# Patient Record
Sex: MALE | Race: WHITE
[De-identification: names, ages, dates, MRNs, and addresses within clinical notes are randomized per-mention and may not be internally consistent; named-entity substitution may affect disease eponyms.]

---

## 2019-07-10 ENCOUNTER — TRANSCRIPTION ENCOUNTER (OUTPATIENT)
Age: 24
End: 2019-07-10

## 2021-11-03 ENCOUNTER — EMERGENCY (EMERGENCY)
Facility: HOSPITAL | Age: 26
LOS: 0 days | Discharge: HOME | End: 2021-11-03
Attending: STUDENT IN AN ORGANIZED HEALTH CARE EDUCATION/TRAINING PROGRAM | Admitting: STUDENT IN AN ORGANIZED HEALTH CARE EDUCATION/TRAINING PROGRAM
Payer: OTHER MISCELLANEOUS

## 2021-11-03 VITALS
OXYGEN SATURATION: 96 % | HEART RATE: 78 BPM | TEMPERATURE: 98 F | RESPIRATION RATE: 18 BRPM | DIASTOLIC BLOOD PRESSURE: 68 MMHG | SYSTOLIC BLOOD PRESSURE: 114 MMHG

## 2021-11-03 DIAGNOSIS — M54.50 LOW BACK PAIN, UNSPECIFIED: ICD-10-CM
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
## 2021-11-03 DIAGNOSIS — S39.012A STRAIN OF MUSCLE, FASCIA AND TENDON OF LOWER BACK, INITIAL ENCOUNTER: ICD-10-CM

## 2021-11-03 PROCEDURE — 99284 EMERGENCY DEPT VISIT MOD MDM: CPT

## 2021-11-03 RX ORDER — IBUPROFEN 200 MG
600 TABLET ORAL ONCE
Refills: 0 | Status: COMPLETED | OUTPATIENT
Start: 2021-11-03 | End: 2021-11-03

## 2021-11-03 RX ORDER — DEXAMETHASONE 0.5 MG/5ML
8 ELIXIR ORAL ONCE
Refills: 0 | Status: COMPLETED | OUTPATIENT
Start: 2021-11-03 | End: 2021-11-03

## 2021-11-03 RX ORDER — IBUPROFEN 200 MG
1 TABLET ORAL
Qty: 21 | Refills: 0
Start: 2021-11-03 | End: 2021-11-09

## 2021-11-03 RX ORDER — METHOCARBAMOL 500 MG/1
1 TABLET, FILM COATED ORAL
Qty: 21 | Refills: 0
Start: 2021-11-03 | End: 2021-11-09

## 2021-11-03 RX ORDER — METHOCARBAMOL 500 MG/1
1000 TABLET, FILM COATED ORAL ONCE
Refills: 0 | Status: COMPLETED | OUTPATIENT
Start: 2021-11-03 | End: 2021-11-03

## 2021-11-03 RX ADMIN — Medication 600 MILLIGRAM(S): at 15:56

## 2021-11-03 RX ADMIN — METHOCARBAMOL 1000 MILLIGRAM(S): 500 TABLET, FILM COATED ORAL at 15:56

## 2021-11-03 RX ADMIN — Medication 8 MILLIGRAM(S): at 15:56

## 2021-11-03 NOTE — ED PROVIDER NOTE - CARE PROVIDER_API CALL
Wilfrido Vasquez (MD)  Orthopaedic Surgery  [address] [place] [address]  Phone: [phone]  Fax: [phone]  Follow Up Time:

## 2021-11-03 NOTE — ED PROVIDER NOTE - NSFOLLOWUPCLINICS_GEN_ALL_ED_FT
[facility] ([facility])  Rehabilitation  [facility] [address], [address] [place] [address]  Phone: [phone]  Fax:

## 2021-11-03 NOTE — ED ADULT TRIAGE NOTE - TEMPERATURE IN FAHRENHEIT (DEGREES F)
"Chief Complaint   Patient presents with   • Pelvic Pain     Pt was seen twice yesterday for c/o pain, states continues to have pain   • Low Back Pain     /86   Pulse 88   Temp 36.9 °C (98.5 °F) (Temporal)   Resp 14   Ht 1.651 m (5' 5\")   Wt 53.5 kg (117 lb 15.1 oz)   LMP 08/10/2021   SpO2 98%   BMI 19.63 kg/m²     Pt ambulated to ED w/ visitor for c/o pelvic pain and low back pain, denies N/V at this time, states has been spotting.    "
97.8

## 2021-11-03 NOTE — ED PROVIDER NOTE - PATIENT PORTAL LINK FT
You can access the FollowMyHealth Patient Portal offered by [facility] by registering at the following website: [email or web address]. By joining [employer]’s FollowMyHealth portal, you will also be able to view your health information using other applications (apps) compatible with our system.
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
## 2022-01-18 ENCOUNTER — EMERGENCY (EMERGENCY)
Facility: HOSPITAL | Age: 27
LOS: 0 days | Discharge: HOME | End: 2022-01-18
Attending: STUDENT IN AN ORGANIZED HEALTH CARE EDUCATION/TRAINING PROGRAM | Admitting: STUDENT IN AN ORGANIZED HEALTH CARE EDUCATION/TRAINING PROGRAM
Payer: COMMERCIAL

## 2022-01-18 VITALS
SYSTOLIC BLOOD PRESSURE: 122 MMHG | RESPIRATION RATE: 18 BRPM | TEMPERATURE: 99 F | HEART RATE: 74 BPM | OXYGEN SATURATION: 99 % | DIASTOLIC BLOOD PRESSURE: 79 MMHG
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
## 2022-01-18 DIAGNOSIS — S09.90XA UNSPECIFIED INJURY OF HEAD, INITIAL ENCOUNTER: ICD-10-CM
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
## 2022-01-18 DIAGNOSIS — S01.112A LACERATION WITHOUT FOREIGN BODY OF LEFT EYELID AND PERIOCULAR AREA, INITIAL ENCOUNTER: ICD-10-CM

## 2022-01-18 PROBLEM — Z78.9 OTHER SPECIFIED HEALTH STATUS: Chronic | Status: ACTIVE | Noted: 2021-11-03

## 2022-01-18 PROCEDURE — 99283 EMERGENCY DEPT VISIT LOW MDM: CPT | Mod: 25

## 2022-01-18 PROCEDURE — 12011 RPR F/E/E/N/L/M 2.5 CM/<: CPT
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
## 2022-01-18 NOTE — ED PROVIDER NOTE - PHYSICAL EXAMINATION
VITAL SIGNS: I have reviewed nursing notes and confirm.  CONSTITUTIONAL: non-toxic, well appearing  SKIN: no rash, no petechiae, (+) 2 cm laceration over L upper eyelid, no active bleeding, streaking or surrounding erythema or edema.   EYES: PERRL, EOMI, pink conjunctiva, anicteric  ENT: tongue midline, no exudates, MMM  NECK: Supple; no meningismus, no JVD  CARD: RRR, no murmurs, equal radial pulses bilaterally 2+  RESP: CTAB, no respiratory distress  ABD: Soft, non-tender, non-distended, no peritoneal signs, no HSM, no CVA tenderness  EXT: Normal ROM x4. No edema. No calves tenderness  NEURO: Alert, oriented. CN2-12 intact, equal strength bilaterally, nl gait.  PSYCH: Cooperative, appropriate. VITAL SIGNS: I have reviewed nursing notes and confirm.  CONSTITUTIONAL: non-toxic, well appearing  SKIN: no rash, no petechiae, (+) 2 cm laceration over L upper eyelid, no active bleeding, streaking or surrounding erythema or edema.   EYES: PERRL, EOMI, pink conjunctiva, anicteric  ENT: tongue midline, no exudates, MMM  NECK: Supple; no cervical tenderness  EXT: Normal ROM x4. No edema. No calves tenderness  NEURO: Alert, oriented. CN2-12 intact, equal strength bilaterally, nl gait.

## 2022-01-18 NOTE — ED PROVIDER NOTE - NSFOLLOWUPINSTRUCTIONS_ED_ALL_ED_FT
Laceration    A laceration is a cut that goes through all of the layers of the skin and into the tissue that is right under the skin. Some lacerations heal on their own. Others need to be closed with stitches (sutures), staples, skin adhesive strips, or skin glue. Proper laceration care minimizes the risk of infection and helps the laceration to heal better.     SEEK IMMEDIATE MEDICAL CARE IF YOU HAVE THE FOLLOWING SYMPTOMS: swelling around the wound, worsening pain, drainage from the wound, red streaking going away from your wound, inability to move finger or toe near the laceration, or discoloration of skin near the laceration.      suture removal in 5 days

## 2022-01-18 NOTE — ED PROVIDER NOTE - NS ED ROS FT
Constitutional: See HPI. No fever/chills.  Eyes: No visual changes, eye pain or discharge.  ENT: No hearing changes, pain, discharge or infections.  Neck: No neck pain or stiffness.  Cardiac: No chest pain, SOB or edema. No chest pain with exertion.  Respiratory: No cough or respiratory distress. No hemoptysis.   GI: No nausea, vomiting, diarrhea or abdominal pain.  MS: No myalgia, muscle weakness, joint pain or back pain.  Neuro: No headache or weakness. No LOC.  Skin: No rash. (+) Laceration to L upper eyelid.   Except as documented in the HPI, all other systems are negative. Constitutional: See HPI. No fever/chills.  Eyes: No visual changes, eye pain or discharge.  ENT: No hearing changes, pain, discharge or infections.  Neck: No neck pain or stiffness.  Neuro: No headache or weakness. No LOC.  Skin: No rash. (+) Laceration to L upper eyelid.   Except as documented in the HPI, all other systems are negative.

## 2022-01-18 NOTE — ED PROVIDER NOTE - CLINICAL SUMMARY MEDICAL DECISION MAKING FREE TEXT BOX
Patient p/w laceration to L eyebrow. Exam as above. Lac repaired. Tetanus utd. No sx of concussion. The patient left the ER prior to my evaluation.

## 2022-01-18 NOTE — ED PROVIDER NOTE - PATIENT PORTAL LINK FT
You can access the FollowMyHealth Patient Portal offered by [facility] by registering at the following website: [email or web address]. By joining [employer]’s FollowMyHealth portal, you will also be able to view your health information using other applications (apps) compatible with our system.

## 2022-01-23 ENCOUNTER — EMERGENCY (EMERGENCY)
Facility: HOSPITAL | Age: 27
LOS: 0 days | Discharge: HOME | End: 2022-01-23
Attending: EMERGENCY MEDICINE | Admitting: EMERGENCY MEDICINE
Payer: COMMERCIAL

## 2022-01-23 VITALS
HEIGHT: 72 IN | HEART RATE: 51 BPM | WEIGHT: 171.96 LBS | DIASTOLIC BLOOD PRESSURE: 66 MMHG | SYSTOLIC BLOOD PRESSURE: 111 MMHG | TEMPERATURE: 97 F | RESPIRATION RATE: 18 BRPM | OXYGEN SATURATION: 100 %

## 2022-01-23 DIAGNOSIS — S01.112D LACERATION WITHOUT FOREIGN BODY OF LEFT EYELID AND PERIOCULAR AREA, SUBSEQUENT ENCOUNTER: ICD-10-CM
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
## 2022-01-23 PROCEDURE — L9995: CPT

## 2022-01-23 NOTE — ED PROVIDER NOTE - PATIENT PORTAL LINK FT
You can access the FollowMyHealth Patient Portal offered by [facility] by registering at the following website: [email or web address]. By joining [employer]’s FollowMyHealth portal, you will also be able to view your health information using other applications (apps) compatible with our system.

## 2022-01-23 NOTE — ED PROVIDER NOTE - OBJECTIVE STATEMENT
Patient requests suture removal, Placed 5 days ago in ED, No complaints offered, no HA, vision changes

## 2022-01-23 NOTE — ED PROVIDER NOTE - ENMT, MLM
Airway patent, Nasal mucosa clear. Mouth with normal mucosa. Throat has no vesicles, no oropharyngeal exudates and uvula is midline. well healed laceration left eyebrow, no infection

## 2022-01-23 NOTE — ED PROCEDURE NOTE - ATTENDING CONTRIBUTION TO CARE
I was present for the key portions of the procedure and available as supervisor for the entire procedure as documented. I was present for the key portions of the procedure and available as supervisor for the entire procedure as documented..
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
## 2022-01-23 NOTE — ED ADULT TRIAGE NOTE - NS ED TRIAGE AVPU SCALE
Patient Alert-The patient is alert, awake and responds to voice. The patient is oriented to time, place, and person. The triage nurse is able to obtain subjective information.

## 2022-12-27 ENCOUNTER — EMERGENCY (EMERGENCY)
Facility: HOSPITAL | Age: 27
LOS: 1 days | Discharge: HOME | End: 2022-12-27
Admitting: EMERGENCY MEDICINE
Payer: OTHER MISCELLANEOUS

## 2022-12-27 VITALS
TEMPERATURE: 98 F | DIASTOLIC BLOOD PRESSURE: 67 MMHG | SYSTOLIC BLOOD PRESSURE: 116 MMHG | OXYGEN SATURATION: 96 % | RESPIRATION RATE: 16 BRPM | HEART RATE: 57 BPM

## 2022-12-27 VITALS
OXYGEN SATURATION: 99 % | SYSTOLIC BLOOD PRESSURE: 109 MMHG | HEART RATE: 72 BPM | WEIGHT: 179.9 LBS | HEIGHT: 72 IN | TEMPERATURE: 98 F | DIASTOLIC BLOOD PRESSURE: 70 MMHG | RESPIRATION RATE: 18 BRPM

## 2022-12-27 PROCEDURE — 99285 EMERGENCY DEPT VISIT HI MDM: CPT

## 2022-12-27 PROCEDURE — 73000 X-RAY EXAM OF COLLAR BONE: CPT | Mod: 26,LT

## 2022-12-27 PROCEDURE — 73030 X-RAY EXAM OF SHOULDER: CPT | Mod: 26,LT

## 2022-12-27 PROCEDURE — 73200 CT UPPER EXTREMITY W/O DYE: CPT | Mod: 26,LT

## 2022-12-27 RX ORDER — ACETAMINOPHEN 500 MG
650 TABLET ORAL ONCE
Refills: 0 | Status: COMPLETED | OUTPATIENT
Start: 2022-12-27 | End: 2022-12-27

## 2022-12-27 RX ORDER — IBUPROFEN 200 MG
600 TABLET ORAL ONCE
Refills: 0 | Status: COMPLETED | OUTPATIENT
Start: 2022-12-27 | End: 2022-12-27

## 2022-12-27 RX ORDER — LIDOCAINE 4 G/100G
1 CREAM TOPICAL ONCE
Refills: 0 | Status: COMPLETED | OUTPATIENT
Start: 2022-12-27 | End: 2022-12-27

## 2022-12-27 RX ORDER — FLUORESCEIN SODIUM 9 MG
1 STRIP OPHTHALMIC (EYE) ONCE
Refills: 0 | Status: COMPLETED | OUTPATIENT
Start: 2022-12-27 | End: 2022-12-27

## 2022-12-27 RX ADMIN — LIDOCAINE 1 PATCH: 4 CREAM TOPICAL at 17:35

## 2022-12-27 RX ADMIN — Medication 1 APPLICATION(S): at 17:36

## 2022-12-27 RX ADMIN — Medication 1 DROP(S): at 17:36

## 2022-12-27 RX ADMIN — Medication 650 MILLIGRAM(S): at 17:36

## 2022-12-27 RX ADMIN — Medication 600 MILLIGRAM(S): at 17:35
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
## 2022-12-27 NOTE — ED PROVIDER NOTE - CARE PROVIDER_API CALL
Km Lopez)  Orthopaedic Surgery  [address] [place] [address], [address]  [place] [address]  Phone: [phone]  Fax: [phone]  Follow Up Time:

## 2022-12-27 NOTE — ED ADULT NURSE NOTE - OBJECTIVE STATEMENT
Patient is an [employer]  presenting to ED c/o L neck, L shoulder, and L eye irritation while at work. Pain rated 7/10. Patient states that he flushed his eyes and that they feel better at this time. Denies f/c/n/v/d, cp, sob, cough. Pt aox4, MAEx4, spont unlabored breathing on ra.

## 2022-12-27 NOTE — ED ADULT TRIAGE NOTE - CHIEF COMPLAINT QUOTE
BIBA c/o L neck, L shoulder and L eye irritation while at work. pt is a [facility] . denies fall. last tdap unk

## 2022-12-27 NOTE — ED PROVIDER NOTE - NSICDXNOPASTMEDICALHX_GEN_ALL_ED
Monitor: The problem is unchanged. Evaluation: No labs/tests required today. Assessment/Treatment:  Continue current treatment/monitoring regimen. <-- Click to add NO pertinent Past Medical History
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
## 2022-12-27 NOTE — ED ADULT NURSE NOTE - CHIEF COMPLAINT QUOTE
BIBA c/o L neck, L shoulder and L eye irritation while at work. pt is a [employer] . denies fall. last tdap unk

## 2022-12-27 NOTE — ED PROVIDER NOTE - PATIENT PORTAL LINK FT
You can access the FollowMyHealth Patient Portal offered by [facility] by registering at the following website: [email or web address]. By joining [employer]’s FollowMyHealth portal, you will also be able to view your health information using other applications (apps) compatible with our system.

## 2022-12-27 NOTE — ED PROVIDER NOTE - NSFOLLOWUPINSTRUCTIONS_ED_ALL_ED_FT
Please follow up your cat scan findings with orthopedics within two weeks    You  may follow up with Dr Denis Lopez or any ortho of your choice       You may take ibuprofen 600 mg every six to eight hours with food for pain. You may also take tylenol 650 mg every six hours for pain, do not exceed 3000 mg daily of tylenol aka acetomenophen. It is safe to take these medications together.    Ice the area 20 min three times a day wear sling as instructed
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
## 2022-12-29 DIAGNOSIS — H11.89 OTHER SPECIFIED DISORDERS OF CONJUNCTIVA: ICD-10-CM

## 2022-12-29 DIAGNOSIS — M25.512 PAIN IN LEFT SHOULDER: ICD-10-CM

## 2023-07-24 NOTE — ED ADULT TRIAGE NOTE - SOURCE OF INFORMATION
[facility] Medicine Services  DISCHARGE SUMMARY    Patient Name: Ian Romero  : 1974  MRN: [id number]    Date of Admission: 2023  7:06 PM  Date of Discharge:  2023  Primary Care Physician: Addison Yepez MD    Consults       No orders found from 2023 to 2023.            Hospital Course     Presenting Problem: acute blood loss anemia    Active Hospital Problems    Diagnosis  POA   • **Acute blood loss anemia [D62]  Yes   • Vaginal bleeding [N93.9]  Yes   • Acute renal failure [N17.9]  Yes   • Generalized anxiety disorder [F41.1]  Unknown      Resolved Hospital Problems   No resolved problems to display.          Hospital Course:  Ian Romero is a 48 y.o. female who was initially admitted to the ICU with acute blood loss anemia, hypertension and vaginal bleeding with acute renal failure.  She underwent a D&C on .  No mass was seen, bleeding better controlled on .  Gynecology and nephrology were consulted.  Gynecology suspects perimenopausal vaginal bleeding with anemia.  After D&C bleeding has stopped.  Pathology is pending.  Recommends cycling with Provera to prevent recurrence of dysfunctional bleeding.  Anemia is currently stabilized.  She needs outpatient gynecology follow-up    Renal failure suspected secondary to anemia with ATN.  Recommend to avoid NSAIDs.  Needs to follow-up with nephrology in 7 to 10 days.      Discharge Follow Up Recommendations for outpatient labs/diagnostics:   Nephrology, gyn, PCP    Day of Discharge     HPI:   No acute overnight events, wishes to go home    Review of Systems  Gen- No fevers, chills  CV- No chest pain, palpitations  Resp- No cough, dyspnea  GI- No N/V/D, abd pain      Vital Signs:   Temp:  [97.9 °F (36.6 °C)-98.9 °F (37.2 °C)] 98.1 °F (36.7 °C)  Heart Rate:  [79-89] 82  Resp:  [16-20] 18  BP: (109-126)/(56-75) 126/73      Physical Exam:  Constitutional: No acute distress, awake, alert  HENT: NCAT,  mucous membranes moist  Respiratory: Clear to auscultation bilaterally, respiratory effort normal   Cardiovascular: RRR, no murmurs, rubs, or gallops  Gastrointestinal: Positive bowel sounds, soft, nontender, nondistended  Musculoskeletal: No bilateral ankle edema  Psychiatric: Appropriate affect, cooperative  Neurologic: Oriented x 3, strength symmetric in all extremities, Cranial Nerves grossly intact to confrontation, speech clear  Skin: No rashes      Pertinent  and/or Most Recent Results     LAB RESULTS:      Lab 07/23/23  1128 07/23/23  0441 07/22/23  1149 07/22/23  0638 07/21/23  2355 07/21/23  1237 07/21/23  0517 07/20/23 1959 07/20/23 1953   WBC  --  6.31  --  8.62  --   --  8.14  --  10.32   HEMOGLOBIN 9.3* 9.2* 8.9* 8.9* 9.3*   < > 6.8*  --  4.3*   HEMATOCRIT 27.9* 27.9* 26.4* 26.6* 27.4*   < > 20.4*  --  13.8*   HEMATOCRIT POC  --   --   --   --   --   --   --    < >  --    PLATELETS  --  203  --  190  --   --  201  --  296   NEUTROS ABS  --  3.74  --  6.54  --   --  5.15  --  7.17*   IMMATURE GRANS (ABS)  --  0.05  --  0.14*  --   --  0.08*  --  0.09*   LYMPHS ABS  --  2.00  --  1.51  --   --  2.16  --  2.21   MONOS ABS  --  0.30  --  0.35  --   --  0.52  --  0.63   EOS ABS  --  0.19  --  0.06  --   --  0.20  --  0.21   MCV  --  89.4  --  88.7  --   --  91.5  --  112.2*   PROTIME  --   --   --   --   --   --   --   --  14.5    < > = values in this interval not displayed.         Lab 07/24/23  1007 07/23/23  0441 07/22/23  0638 07/21/23  0518 07/20/23 1959 07/20/23 1953   SODIUM 140 141 139 137  --  136   POTASSIUM 4.1 4.5 4.4 4.1  --  4.0   CHLORIDE 103 109* 108* 106  --  105   CO2 25.0 19.0* 18.0* 17.0*  --  15.0*   ANION GAP 12.0 13.0 13.0 14.0  --  16.0*   BUN 30* 35* 33* 33*  --  32*   CREATININE 3.04* 3.72* 4.22* 4.35* 5.30 4.26*   EGFR 18.2* 14.3* 12.3* 11.9*  --  12.2*   GLUCOSE 129* 93 143* 130*  --  138*   CALCIUM 7.7* 7.4* 7.3* 7.2*  --  8.3*   MAGNESIUM  --  1.7 1.7 1.9  --   --     PHOSPHORUS  --  4.5 4.3 6.1*  --   --    TSH  --   --   --  1.270  --   --          Lab 07/22/23  0638 07/21/23 0518 07/20/23 1953   TOTAL PROTEIN 4.9* 4.2* 5.6*   ALBUMIN 2.8* 2.6* 3.3*   GLOBULIN 2.1 1.6 2.3   ALT (SGPT) 7 8 11   AST (SGOT) 8 8 10   BILIRUBIN <0.2 0.4 <0.2   ALK PHOS 45 30* 38*   LIPASE  --   --  59         Lab 07/20/23 1953   HSTROP T 25*   PROTIME 14.5   INR 1.11             Lab 07/21/23 0518 07/20/23 2010 07/20/23 1953   IRON 120  --   --    IRON SATURATION (TSAT) 44  --   --    TIBC 276*  --   --    TRANSFERRIN 185*  --   --    FERRITIN  --   --  29.49   FOLATE 4.06*  --   --    VITAMIN B 12 355  --   --    ABO TYPING  --  O O   RH TYPING  --  Positive Positive   ANTIBODY SCREEN  --   --  Negative         Lab 07/20/23  2307   PH, ARTERIAL 7.360   PCO2, ARTERIAL 29.4*   PO2 ART 84.8   FIO2 21   HCO3 ART 16.6*   BASE EXCESS ART -8.2*   CARBOXYHEMOGLOBIN 2.6*     Brief Urine Lab Results  (Last result in the past 365 days)        Color   Clarity   Blood   Leuk Est   Nitrite   Protein   CREAT   Urine HCG        07/21/23 0028             114.9         07/21/23 0028 Yellow   Clear   Trace   Negative   Negative   100 mg/dL (2+)           07/21/23 0028               Negative             Microbiology Results (last 10 days)       Procedure Component Value - Date/Time    Eosinophil Smear - Urine, Urine, Clean Catch [[id number]]  (Normal) Collected: 07/21/23 0028    Lab Status: Final result Specimen: Urine, Clean Catch Updated: 07/21/23 0828     Eosinophil Smear 0 % EOS/100 Cells     Narrative:      No eosinophil seen            Adult Transthoracic Echo Complete W/ Cont if Necessary Per Protocol    Result Date: 7/21/2023  •  Left ventricular systolic function is normal. Left ventricular ejection fraction appears to be 61 - 65%. •  Normal right ventricular cavity size and systolic function noted. •  There is no evidence of pericardial effusion. . •  Cardiac valves are structurally and functionally  normal     CT Abdomen Pelvis Without Contrast    Result Date: 7/20/2023  CT CHEST WO CONTRAST DIAGNOSTIC, CT ABDOMEN PELVIS WO CONTRAST Date of Exam: 7/20/2023 8:29 PM EDT Indication: anemia, SOA, heavy smoker, acute renal failure. Comparison: None available. Technique: Axial CT images were obtained of the chest abdomen and pelvis without contrast administration.  Reconstructed coronal and sagittal images were also obtained. Automated exposure control and iterative construction methods were used. Findings: Chest: Venus/mediastinum: No adenopathy. Thoracic aorta normal in caliber. Coronary artery calcification. No pericardial effusion Lungs/pleura: Emphysema in the apices. Subtle centrilobular groundglass nodules in the upper lungs. Plaque-like nodules related to the major and minor fissures compatible with perifissural lymph nodes. 4 mm nodule in the anterior right upper lobe. 7 mm solid nodule in the left lower lobe. No airspace consolidation. No pleural effusion Bones/soft tissues: No acute bony abnormality Abdomen/Pelvis: Organs: No urolithiasis or hydronephrosis. Bilateral renal atrophy 1.3 cm exophytic right renal lesion with density greater than simple fluid. Liver, spleen, pancreas, adrenal glands have an unremarkable noncontrast appearance. Gallbladder surgically absent Gastrointestinal: No acute abnormality. Normal appendix. Diverticula of the descending and sigmoid colon with no associated inflammation. No mesenteric adenopathy Pelvis: No abnormal fluid collection. Reproductive organs within normal limits, with 2.5 cm left ovarian cyst almost certainly benign. Urinary bladder unremarkable Peritoneum/Retroperitoneum: No ascites or pneumoperitoneum. No retroperitoneal adenopathy. Normal caliber aorta Bones/Soft Tissues: No acute bony abnormality. Large fat-containing umbilical hernia     Chest- 1. Pulmonary emphysema 2. Nonspecific subtle centrilobular groundglass nodules in the upper lungs. Respiratory  bronchiolitis is a primary consideration 3. Pulmonary nodules up to 7 mm. Recommend follow-up chest CT in 6 months 4. Calcific coronary atherosclerosis Abdomen/pelvis- 1. No acute process. No obstructive uropathy 2. Bilateral renal atrophy 3. 1.3 cm hyperattenuating right renal lesion, likely proteinaceous cyst, less likely mass. Further evaluation with contrast-enhanced MRI or CT should be considered 4. Colonic diverticulosis Electronically Signed: Tacos Collier  7/20/2023 8:59 PM EDT  Workstation ID: [id number]    CT Chest Without Contrast Diagnostic    Result Date: 7/20/2023  CT CHEST WO CONTRAST DIAGNOSTIC, CT ABDOMEN PELVIS WO CONTRAST Date of Exam: 7/20/2023 8:29 PM EDT Indication: anemia, SOA, heavy smoker, acute renal failure. Comparison: None available. Technique: Axial CT images were obtained of the chest abdomen and pelvis without contrast administration.  Reconstructed coronal and sagittal images were also obtained. Automated exposure control and iterative construction methods were used. Findings: Chest: Venus/mediastinum: No adenopathy. Thoracic aorta normal in caliber. Coronary artery calcification. No pericardial effusion Lungs/pleura: Emphysema in the apices. Subtle centrilobular groundglass nodules in the upper lungs. Plaque-like nodules related to the major and minor fissures compatible with perifissural lymph nodes. 4 mm nodule in the anterior right upper lobe. 7 mm solid nodule in the left lower lobe. No airspace consolidation. No pleural effusion Bones/soft tissues: No acute bony abnormality Abdomen/Pelvis: Organs: No urolithiasis or hydronephrosis. Bilateral renal atrophy 1.3 cm exophytic right renal lesion with density greater than simple fluid. Liver, spleen, pancreas, adrenal glands have an unremarkable noncontrast appearance. Gallbladder surgically absent Gastrointestinal: No acute abnormality. Normal appendix. Diverticula of the descending and sigmoid colon with no associated inflammation. No  mesenteric adenopathy Pelvis: No abnormal fluid collection. Reproductive organs within normal limits, with 2.5 cm left ovarian cyst almost certainly benign. Urinary bladder unremarkable Peritoneum/Retroperitoneum: No ascites or pneumoperitoneum. No retroperitoneal adenopathy. Normal caliber aorta Bones/Soft Tissues: No acute bony abnormality. Large fat-containing umbilical hernia     Chest- 1. Pulmonary emphysema 2. Nonspecific subtle centrilobular groundglass nodules in the upper lungs. Respiratory bronchiolitis is a primary consideration 3. Pulmonary nodules up to 7 mm. Recommend follow-up chest CT in 6 months 4. Calcific coronary atherosclerosis Abdomen/pelvis- 1. No acute process. No obstructive uropathy 2. Bilateral renal atrophy 3. 1.3 cm hyperattenuating right renal lesion, likely proteinaceous cyst, less likely mass. Further evaluation with contrast-enhanced MRI or CT should be considered 4. Colonic diverticulosis Electronically Signed: Tacos Collier  7/20/2023 8:59 PM EDT  Workstation ID: [id number]    US Non-ob Transvaginal    Result Date: 7/20/2023  US NON-OB TRANSVAGINAL Date of Exam: 7/20/2023 10:06 PM EDT Indication: heavy vaginal bleeding; post-menopausal. Comparison: No comparisons available. Technique: Transvaginal pelvic ultrasound was performed.  Grayscale and color Doppler imaging was utilized to evaluate the pelvic area with image documentation per protocol. Findings: Sonographer indicates that the patient has limited ability to tolerate the exam, which limits detail somewhat. The uterus is borderline enlarged in size, particular for patient's age, at 8.1 x 4.1 x 4.8 cm. Image detail on the scan is limited. Endometrium is heterogeneous and ill-defined, with mixed echogenicity, roughly estimated at 1.2 cm in width, although appearance is inconsistent from imaged image. The sonographer notes a rounded hypoechoic nodular area in the cervix 1.3 cm in diameter, somewhat difficult to appreciate on this  exam. There appear to be a couple of nearly isoechoic uterine fibroids, maximum 1.4 cm in diameter and 3 cm in diameter but  poorly seen. Right ovary measures 3.5 x 2.0 x 2.9 cm and contains multiple small cysts. There is expected color Doppler flow. Left ovary is larger at 4.4 x 2.6 x 3.2 cm and contains a thin-walled, mildly irregularly-shaped 3.7 cm cyst which appears anechoic except for a single thin internal septation. There is normal color Doppler flow in the left ovary. No pathologic adnexal mass or abnormal intrapelvic free fluid is seen.     Impression: 1. Technically limited exam, but with prominent, perhaps mildly enlarged size of the uterus, and heterogeneous, thickened and ill-defined endometrium. Suspected fibroids, not clearly visualized on this exam. Consider evaluation by gynecology service. If the patient is unable to tolerate further ultrasound scanning, pelvic MRI may be helpful at some point. 2. Grossly normal-appearing right ovary. 3. 3.7 cm left ovarian cyst, slightly irregular in outline, but largely appearing as a simple cyst. Electronically Signed: Victor Hugo Valdes  7/20/2023 10:57 PM EDT  Workstation ID: [id number]    XR Chest 1 View    Result Date: 7/20/2023  XR CHEST 1 VW Date of Exam: 7/20/2023 7:34 PM EDT Indication: SOA Comparison: None available. Findings: Normal cardiomediastinal silhouette. The lungs are clear. No pleural effusion or pneumothorax. No acute osseous findings.     Impression: No acute cardiopulmonary findings. Electronically Signed: David Sarmiento  7/20/2023 7:47 PM EDT  Workstation ID: [id number]             Results for orders placed during the hospital encounter of 07/20/23    Adult Transthoracic Echo Complete W/ Cont if Necessary Per Protocol    Interpretation Summary  •  Left ventricular systolic function is normal. Left ventricular ejection fraction appears to be 61 - 65%.  •  Normal right ventricular cavity size and systolic function noted.  •  There is no evidence of  pericardial effusion. .  •  Cardiac valves are structurally and functionally normal      Plan for Follow-up of Pending Labs/Results: f/u w PCP  Pending Labs       Order Current Status    SARA In process    Anti-DNA Antibody, Double-stranded In process    Antimyeloperoxidase (MPO) Antibodies In process    Antiproteinase 3 (UT-3) Antibodies In process          Discharge Details        Discharge Medications      Patient Not Prescribed Medications Upon Discharge         Allergies   Allergen Reactions   • Augmentin [Amoxicillin-Pot Clavulanate] Itching and Swelling     Swelling of tongue         Discharge Disposition:  Home or Self Care    Diet:  Hospital:  Diet Order   Procedures   • Diet: Regular/House Diet; Texture: Regular Texture (IDDSI 7); Fluid Consistency: Thin (IDDSI 0)       Activity:  as tolerated    Restrictions or Other Recommendations:  As above       CODE STATUS:    Code Status and Medical Interventions:   Ordered at: 07/21/23 0028     Code Status (Patient has no pulse and is not breathing):    CPR (Attempt to Resuscitate)     Medical Interventions (Patient has pulse or is breathing):    Full Support     Release to patient:    Routine Release       No future appointments.              Anabel Brar MD  07/24/23      Time Spent on Discharge:  I spent  45  minutes on this discharge activity which included: face-to-face encounter with the patient, reviewing the data in the system, coordination of the care with the nursing staff as well as consultants, documentation, and entering orders.           Patient

## 2023-10-26 ENCOUNTER — INPATIENT (INPATIENT)
Facility: HOSPITAL | Age: 28
LOS: 1 days | Discharge: HOME | DRG: 603 | End: 2023-10-28
Attending: INTERNAL MEDICINE | Admitting: INTERNAL MEDICINE
Payer: COMMERCIAL

## 2023-10-26 VITALS
SYSTOLIC BLOOD PRESSURE: 117 MMHG | WEIGHT: 179.9 LBS | TEMPERATURE: 98 F | HEIGHT: 68 IN | DIASTOLIC BLOOD PRESSURE: 72 MMHG | HEART RATE: 93 BPM | OXYGEN SATURATION: 100 % | RESPIRATION RATE: 18 BRPM

## 2023-10-26 DIAGNOSIS — L03.119 CELLULITIS OF UNSPECIFIED PART OF LIMB: ICD-10-CM

## 2023-10-26 LAB
ALBUMIN SERPL ELPH-MCNC: 4.7 G/DL — SIGNIFICANT CHANGE UP (ref 3.5–5.2)
ALBUMIN SERPL ELPH-MCNC: 4.7 G/DL — SIGNIFICANT CHANGE UP (ref 3.5–5.2)
ALP SERPL-CCNC: 66 U/L — SIGNIFICANT CHANGE UP (ref 30–115)
ALP SERPL-CCNC: 66 U/L — SIGNIFICANT CHANGE UP (ref 30–115)
ALT FLD-CCNC: 12 U/L — SIGNIFICANT CHANGE UP (ref 0–41)
ALT FLD-CCNC: 12 U/L — SIGNIFICANT CHANGE UP (ref 0–41)
ANION GAP SERPL CALC-SCNC: 9 MMOL/L — SIGNIFICANT CHANGE UP (ref 7–14)
ANION GAP SERPL CALC-SCNC: 9 MMOL/L — SIGNIFICANT CHANGE UP (ref 7–14)
AST SERPL-CCNC: 16 U/L — SIGNIFICANT CHANGE UP (ref 0–41)
AST SERPL-CCNC: 16 U/L — SIGNIFICANT CHANGE UP (ref 0–41)
BASOPHILS # BLD AUTO: 0.05 K/UL — SIGNIFICANT CHANGE UP (ref 0–0.2)
BASOPHILS # BLD AUTO: 0.05 K/UL — SIGNIFICANT CHANGE UP (ref 0–0.2)
BASOPHILS NFR BLD AUTO: 0.4 % — SIGNIFICANT CHANGE UP (ref 0–1)
BASOPHILS NFR BLD AUTO: 0.4 % — SIGNIFICANT CHANGE UP (ref 0–1)
BILIRUB SERPL-MCNC: 1.1 MG/DL — SIGNIFICANT CHANGE UP (ref 0.2–1.2)
BILIRUB SERPL-MCNC: 1.1 MG/DL — SIGNIFICANT CHANGE UP (ref 0.2–1.2)
BUN SERPL-MCNC: 11 MG/DL — SIGNIFICANT CHANGE UP (ref 10–20)
BUN SERPL-MCNC: 11 MG/DL — SIGNIFICANT CHANGE UP (ref 10–20)
CALCIUM SERPL-MCNC: 9.5 MG/DL — SIGNIFICANT CHANGE UP (ref 8.4–10.5)
CALCIUM SERPL-MCNC: 9.5 MG/DL — SIGNIFICANT CHANGE UP (ref 8.4–10.5)
CHLORIDE SERPL-SCNC: 100 MMOL/L — SIGNIFICANT CHANGE UP (ref 98–110)
CHLORIDE SERPL-SCNC: 100 MMOL/L — SIGNIFICANT CHANGE UP (ref 98–110)
CO2 SERPL-SCNC: 27 MMOL/L — SIGNIFICANT CHANGE UP (ref 17–32)
CO2 SERPL-SCNC: 27 MMOL/L — SIGNIFICANT CHANGE UP (ref 17–32)
CREAT SERPL-MCNC: 1 MG/DL — SIGNIFICANT CHANGE UP (ref 0.7–1.5)
CREAT SERPL-MCNC: 1 MG/DL — SIGNIFICANT CHANGE UP (ref 0.7–1.5)
EGFR: 104 ML/MIN/1.73M2 — SIGNIFICANT CHANGE UP
EGFR: 104 ML/MIN/1.73M2 — SIGNIFICANT CHANGE UP
EOSINOPHIL # BLD AUTO: 0.06 K/UL — SIGNIFICANT CHANGE UP (ref 0–0.7)
EOSINOPHIL # BLD AUTO: 0.06 K/UL — SIGNIFICANT CHANGE UP (ref 0–0.7)
EOSINOPHIL NFR BLD AUTO: 0.5 % — SIGNIFICANT CHANGE UP (ref 0–8)
EOSINOPHIL NFR BLD AUTO: 0.5 % — SIGNIFICANT CHANGE UP (ref 0–8)
GLUCOSE SERPL-MCNC: 89 MG/DL — SIGNIFICANT CHANGE UP (ref 70–99)
GLUCOSE SERPL-MCNC: 89 MG/DL — SIGNIFICANT CHANGE UP (ref 70–99)
HCT VFR BLD CALC: 44.8 % — SIGNIFICANT CHANGE UP (ref 42–52)
HCT VFR BLD CALC: 44.8 % — SIGNIFICANT CHANGE UP (ref 42–52)
HGB BLD-MCNC: 15.6 G/DL — SIGNIFICANT CHANGE UP (ref 14–18)
HGB BLD-MCNC: 15.6 G/DL — SIGNIFICANT CHANGE UP (ref 14–18)
IMM GRANULOCYTES NFR BLD AUTO: 0.3 % — SIGNIFICANT CHANGE UP (ref 0.1–0.3)
IMM GRANULOCYTES NFR BLD AUTO: 0.3 % — SIGNIFICANT CHANGE UP (ref 0.1–0.3)
LACTATE SERPL-SCNC: 1.3 MMOL/L — SIGNIFICANT CHANGE UP (ref 0.7–2)
LACTATE SERPL-SCNC: 1.3 MMOL/L — SIGNIFICANT CHANGE UP (ref 0.7–2)
LYMPHOCYTES # BLD AUTO: 16.4 % — LOW (ref 20.5–51.1)
LYMPHOCYTES # BLD AUTO: 16.4 % — LOW (ref 20.5–51.1)
LYMPHOCYTES # BLD AUTO: 2.01 K/UL — SIGNIFICANT CHANGE UP (ref 1.2–3.4)
LYMPHOCYTES # BLD AUTO: 2.01 K/UL — SIGNIFICANT CHANGE UP (ref 1.2–3.4)
MCHC RBC-ENTMCNC: 31.9 PG — HIGH (ref 27–31)
MCHC RBC-ENTMCNC: 31.9 PG — HIGH (ref 27–31)
MCHC RBC-ENTMCNC: 34.8 G/DL — SIGNIFICANT CHANGE UP (ref 32–37)
MCHC RBC-ENTMCNC: 34.8 G/DL — SIGNIFICANT CHANGE UP (ref 32–37)
MCV RBC AUTO: 91.6 FL — SIGNIFICANT CHANGE UP (ref 80–94)
MCV RBC AUTO: 91.6 FL — SIGNIFICANT CHANGE UP (ref 80–94)
MONOCYTES # BLD AUTO: 0.91 K/UL — HIGH (ref 0.1–0.6)
MONOCYTES # BLD AUTO: 0.91 K/UL — HIGH (ref 0.1–0.6)
MONOCYTES NFR BLD AUTO: 7.4 % — SIGNIFICANT CHANGE UP (ref 1.7–9.3)
MONOCYTES NFR BLD AUTO: 7.4 % — SIGNIFICANT CHANGE UP (ref 1.7–9.3)
NEUTROPHILS # BLD AUTO: 9.21 K/UL — HIGH (ref 1.4–6.5)
NEUTROPHILS # BLD AUTO: 9.21 K/UL — HIGH (ref 1.4–6.5)
NEUTROPHILS NFR BLD AUTO: 75 % — SIGNIFICANT CHANGE UP (ref 42.2–75.2)
NEUTROPHILS NFR BLD AUTO: 75 % — SIGNIFICANT CHANGE UP (ref 42.2–75.2)
NRBC # BLD: 0 /100 WBCS — SIGNIFICANT CHANGE UP (ref 0–0)
NRBC # BLD: 0 /100 WBCS — SIGNIFICANT CHANGE UP (ref 0–0)
PLATELET # BLD AUTO: 245 K/UL — SIGNIFICANT CHANGE UP (ref 130–400)
PLATELET # BLD AUTO: 245 K/UL — SIGNIFICANT CHANGE UP (ref 130–400)
PMV BLD: 9.5 FL — SIGNIFICANT CHANGE UP (ref 7.4–10.4)
PMV BLD: 9.5 FL — SIGNIFICANT CHANGE UP (ref 7.4–10.4)
POTASSIUM SERPL-MCNC: 4.3 MMOL/L — SIGNIFICANT CHANGE UP (ref 3.5–5)
POTASSIUM SERPL-MCNC: 4.3 MMOL/L — SIGNIFICANT CHANGE UP (ref 3.5–5)
POTASSIUM SERPL-SCNC: 4.3 MMOL/L — SIGNIFICANT CHANGE UP (ref 3.5–5)
POTASSIUM SERPL-SCNC: 4.3 MMOL/L — SIGNIFICANT CHANGE UP (ref 3.5–5)
PROT SERPL-MCNC: 6.9 G/DL — SIGNIFICANT CHANGE UP (ref 6–8)
PROT SERPL-MCNC: 6.9 G/DL — SIGNIFICANT CHANGE UP (ref 6–8)
RBC # BLD: 4.89 M/UL — SIGNIFICANT CHANGE UP (ref 4.7–6.1)
RBC # BLD: 4.89 M/UL — SIGNIFICANT CHANGE UP (ref 4.7–6.1)
RBC # FLD: 11.8 % — SIGNIFICANT CHANGE UP (ref 11.5–14.5)
RBC # FLD: 11.8 % — SIGNIFICANT CHANGE UP (ref 11.5–14.5)
SODIUM SERPL-SCNC: 136 MMOL/L — SIGNIFICANT CHANGE UP (ref 135–146)
SODIUM SERPL-SCNC: 136 MMOL/L — SIGNIFICANT CHANGE UP (ref 135–146)
WBC # BLD: 12.28 K/UL — HIGH (ref 4.8–10.8)
WBC # BLD: 12.28 K/UL — HIGH (ref 4.8–10.8)
WBC # FLD AUTO: 12.28 K/UL — HIGH (ref 4.8–10.8)
WBC # FLD AUTO: 12.28 K/UL — HIGH (ref 4.8–10.8)

## 2023-10-26 PROCEDURE — 99285 EMERGENCY DEPT VISIT HI MDM: CPT

## 2023-10-26 PROCEDURE — 85027 COMPLETE CBC AUTOMATED: CPT

## 2023-10-26 PROCEDURE — 99221 1ST HOSP IP/OBS SF/LOW 40: CPT

## 2023-10-26 PROCEDURE — 80048 BASIC METABOLIC PNL TOTAL CA: CPT

## 2023-10-26 PROCEDURE — 84100 ASSAY OF PHOSPHORUS: CPT

## 2023-10-26 PROCEDURE — G0378: CPT

## 2023-10-26 PROCEDURE — 83735 ASSAY OF MAGNESIUM: CPT

## 2023-10-26 PROCEDURE — 36415 COLL VENOUS BLD VENIPUNCTURE: CPT

## 2023-10-26 PROCEDURE — 87040 BLOOD CULTURE FOR BACTERIA: CPT

## 2023-10-26 PROCEDURE — 73090 X-RAY EXAM OF FOREARM: CPT | Mod: 26,LT

## 2023-10-26 RX ORDER — ACETAMINOPHEN 500 MG
650 TABLET ORAL EVERY 6 HOURS
Refills: 0 | Status: DISCONTINUED | OUTPATIENT
Start: 2023-10-26 | End: 2023-10-28

## 2023-10-26 RX ORDER — NICOTINE POLACRILEX 2 MG
1 GUM BUCCAL DAILY
Refills: 0 | Status: DISCONTINUED | OUTPATIENT
Start: 2023-10-26 | End: 2023-10-28

## 2023-10-26 RX ORDER — LANOLIN ALCOHOL/MO/W.PET/CERES
3 CREAM (GRAM) TOPICAL AT BEDTIME
Refills: 0 | Status: DISCONTINUED | OUTPATIENT
Start: 2023-10-26 | End: 2023-10-28

## 2023-10-26 RX ORDER — ENOXAPARIN SODIUM 100 MG/ML
40 INJECTION SUBCUTANEOUS EVERY 24 HOURS
Refills: 0 | Status: DISCONTINUED | OUTPATIENT
Start: 2023-10-26 | End: 2023-10-28

## 2023-10-26 RX ORDER — CHLORHEXIDINE GLUCONATE 213 G/1000ML
1 SOLUTION TOPICAL
Refills: 0 | Status: DISCONTINUED | OUTPATIENT
Start: 2023-10-26 | End: 2023-10-28

## 2023-10-26 RX ADMIN — Medication 100 MILLIGRAM(S): at 18:39

## 2023-10-26 NOTE — ED PROVIDER NOTE - NS ED ATTENDING STATEMENT MOD
[email or web address] This was a shared visit with the STEVIE. I reviewed and verified the documentation and independently performed the documented:

## 2023-10-26 NOTE — H&P ADULT - HISTORY OF PRESENT ILLNESS
Patient is a 27yo male w/ no significant PMHx presenting w, worsening redness and swelling on his left arm. Report 1 week ago he noticed a small pimple/ingrown hair, he took his old scissors and tried to drain it, a little blood came out at time. Reports since than the redness and swelling has been worsening so he went to his PCP and was prescribed Keflex and Bactrim w/ no changes in wound. Reports serous drainage from the wound when pressing on it. Reports received his Tdap vaccine last year. Denies fever, chills, n/v, chest pain, sob, numbness, tingling, loss of sensation, changes in BMs, or any urinary sxs.

## 2023-10-26 NOTE — ED PROVIDER NOTE - CLINICAL SUMMARY MEDICAL DECISION MAKING FREE TEXT BOX
28 y.o. male, no PMHx, presenting w/ worsening redness and swelling on his left arm. Pt states that 1 week ago he noticed a small pimple/ingrown hair, he took his old scissors and tried to drain it, a little blood came out at time. Since that time, redness and swelling has been worsening so he went to his PCP and was prescribed Keflex and Bactrim w/ no changes in wound. Reports serous drainage from the wound when pressing on it. Reports received his Tdap vaccine last year. Denies fever, chills, n/v, chest pain, SOB, numbness, tingling, loss of sensation, changes in BMs, or any urinary sxs. On exam, pt in NAD, AAOx3, head NC/AT, CN II-XII intact, PEERL, EOMi, neck (-) midline tenderness, lungs CTA B/L, CV S1S2 regular, abdomen soft/NT/ND/(+)BS, skin (+) abscess with induration and tenderness over extensor surface of the proximal forearm with surrounding edema extending to elbow and wrist, FROM of shoulder/elbow/wrist, good , sensation intact. Labs/XR done. IV abx ordered. Bedside US (+) cobblestoning but no drainable fluid collection. Will admit for IV abx.

## 2023-10-26 NOTE — H&P ADULT - NSHPPHYSICALEXAM_GEN_ALL_CORE
Vital Signs Last 24 Hrs  T(C): 36.4 (26 Oct 2023 20:43), Max: 36.8 (26 Oct 2023 17:43)  T(F): 97.6 (26 Oct 2023 20:43), Max: 98.2 (26 Oct 2023 17:43)  HR: 68 (26 Oct 2023 20:43) (68 - 93)  BP: 113/66 (26 Oct 2023 20:43) (113/66 - 117/72)  RR: 18 (26 Oct 2023 20:43) (18 - 18)  SpO2: 97% (26 Oct 2023 20:43) (97% - 100%)    Parameters below as of 26 Oct 2023 20:43  Patient On (Oxygen Delivery Method): room air      GENERAL:  29y/o Male NAD, resting comfortably.  HEAD:  Atraumatic, Normocephalic  EYES: EOMI, PERRLA, conjunctiva and sclera clear  NECK: Supple, No JVD, no cervical lymphadenopathy, non-tender  CHEST/LUNG: Clear to auscultation bilaterally; No wheeze, rhonchi, or rales  HEART: Regular rate and rhythm; S1&S2  ABDOMEN: Soft, Nontender, Nondistended x 4 quadrants; Bowel sounds present  EXTREMITIES:   Peripheral Pulses Present, No clubbing, no cyanosis, or no edema, no calf tenderness  PSYCH: AAOx3, cooperative, appropriate  NEUROLOGY: WNL  SKIN: left forearm extensor surface w/ +abscess/indruation w/ surround erythema and warmth and mild ttp

## 2023-10-26 NOTE — H&P ADULT - NSHPLABSRESULTS_GEN_ALL_CORE
15.6   12.28 )-----------( 245      ( 26 Oct 2023 18:45 )             44.8       10-26    136  |  100  |  11  ----------------------------<  89  4.3   |  27  |  1.0    Ca    9.5      26 Oct 2023 18:45    TPro  6.9  /  Alb  4.7  /  TBili  1.1  /  DBili  x   /  AST  16  /  ALT  12  /  AlkPhos  66  10-26              Urinalysis Basic - ( 26 Oct 2023 18:45 )    Color: x / Appearance: x / SG: x / pH: x  Gluc: 89 mg/dL / Ketone: x  / Bili: x / Urobili: x   Blood: x / Protein: x / Nitrite: x   Leuk Esterase: x / RBC: x / WBC x   Sq Epi: x / Non Sq Epi: x / Bacteria: x            Lactate Trend  10-26 @ 18:45 Lactate:1.3

## 2023-10-26 NOTE — H&P ADULT - NSHPSOCIALHISTORY_GEN_ALL_CORE
Patient reports using a nicotine patch.  Reports quit smoking, denies drinking, or any illicit drug use.

## 2023-10-26 NOTE — H&P ADULT - NS ATTEND AMEND GEN_ALL_CORE FT
Patient seen at bedside, seems comfortable Patient seen at bedside, seems comfortable, agree with above Patient seen at bedside, seems comfortable, agree with above ([place] IVPB pending day staff , infectious disease consult inputs) Consider I+D?

## 2023-10-26 NOTE — ED PROVIDER NOTE - OBJECTIVE STATEMENT
27 y/o male presents to the Ed for evaluation of redness , swelling worsening over past few days despite taking keflex and bactrim. patient states area started as possible ingrown hair. patient denies any streaking up arm. no fevers. patient without tingling distally. patient with oozing serous material from wound. patient with decreased rom

## 2023-10-26 NOTE — ED PROVIDER NOTE - PHYSICAL EXAMINATION
Vital Signs: I have reviewed the initial vital signs.  Constitutional: well-nourished, no acute distress, normocephalic  Eyes: PERRLA, EOMI,  clear conjunctiva  ENT: MMM,   Cardiovascular: regular rate, regular rhythm, no murmur appreciated  Musculoskeletal: left upper extremity +good rom of elbow and wrist  Integumentary: +abscess with induration and tenderness extensor surface forearm proximal with surrounding edema extending to elbow and wrist  heme: no lymphangitis   Neurologic: awake, alert, cranial nerves II-XII grossly intact, extremities’ motor and sensory functions grossly intact, no focal deficits,

## 2023-10-26 NOTE — H&P ADULT - ASSESSMENT
Patient is a 29yo male w/ no significant PMHx presenting w, worsening redness and swelling on his left arm. Report 1 week ago he noticed a small pimple/ingrown hair, he took his old scissors and tried to drain it, a little blood came out at time. Reports since than the redness and swelling has been worsening so he went to his PCP and was prescribed Keflex and Bactrim w/ no changes in wound. Reports serous drainage from the wound when pressing on it. Reports received his Tdap vaccine last year. Denies fever, chills, n/v, chest pain, sob, numbness, tingling, loss of sensation, changes in BMs, or any urinary sxs.     #Cellulitis of forearm    - Afebrile, WBC 12.82, Lactate 1.3, left forearm extensor surface w/ +abscess/indruation w/ surround erythema and warmth and mild ttp  - IV abx  - Pain control prn  - FU Blood Cx  - FU AM Labs  - ID Consult     Diet:  Regular   Activity: AAT   VTE PPX: Lovenox   Code Status: Full Code      Plan Discussed and approved by attending on call.

## 2023-10-26 NOTE — PATIENT PROFILE ADULT - FALL HARM RISK - UNIVERSAL INTERVENTIONS
Bed in lowest position, wheels locked, appropriate side rails in place/Call bell, personal items and telephone in reach/Instruct patient to call for assistance before getting out of bed or chair/Non-slip footwear when patient is out of bed/[place] to call system/Physically safe environment - no spills, clutter or unnecessary equipment/Purposeful Proactive Rounding/Room/bathroom lighting operational, light cord in reach

## 2023-10-27 DIAGNOSIS — L02.414 CUTANEOUS ABSCESS OF LEFT UPPER LIMB: ICD-10-CM

## 2023-10-27 LAB
ANION GAP SERPL CALC-SCNC: 12 MMOL/L — SIGNIFICANT CHANGE UP (ref 7–14)
ANION GAP SERPL CALC-SCNC: 12 MMOL/L — SIGNIFICANT CHANGE UP (ref 7–14)
BUN SERPL-MCNC: 11 MG/DL — SIGNIFICANT CHANGE UP (ref 10–20)
BUN SERPL-MCNC: 11 MG/DL — SIGNIFICANT CHANGE UP (ref 10–20)
CALCIUM SERPL-MCNC: 9.5 MG/DL — SIGNIFICANT CHANGE UP (ref 8.4–10.5)
CALCIUM SERPL-MCNC: 9.5 MG/DL — SIGNIFICANT CHANGE UP (ref 8.4–10.5)
CHLORIDE SERPL-SCNC: 99 MMOL/L — SIGNIFICANT CHANGE UP (ref 98–110)
CHLORIDE SERPL-SCNC: 99 MMOL/L — SIGNIFICANT CHANGE UP (ref 98–110)
CO2 SERPL-SCNC: 24 MMOL/L — SIGNIFICANT CHANGE UP (ref 17–32)
CO2 SERPL-SCNC: 24 MMOL/L — SIGNIFICANT CHANGE UP (ref 17–32)
CREAT SERPL-MCNC: 1 MG/DL — SIGNIFICANT CHANGE UP (ref 0.7–1.5)
CREAT SERPL-MCNC: 1 MG/DL — SIGNIFICANT CHANGE UP (ref 0.7–1.5)
EGFR: 104 ML/MIN/1.73M2 — SIGNIFICANT CHANGE UP
EGFR: 104 ML/MIN/1.73M2 — SIGNIFICANT CHANGE UP
GLUCOSE SERPL-MCNC: 95 MG/DL — SIGNIFICANT CHANGE UP (ref 70–99)
GLUCOSE SERPL-MCNC: 95 MG/DL — SIGNIFICANT CHANGE UP (ref 70–99)
HCT VFR BLD CALC: 42.9 % — SIGNIFICANT CHANGE UP (ref 42–52)
HCT VFR BLD CALC: 42.9 % — SIGNIFICANT CHANGE UP (ref 42–52)
HGB BLD-MCNC: 15.3 G/DL — SIGNIFICANT CHANGE UP (ref 14–18)
HGB BLD-MCNC: 15.3 G/DL — SIGNIFICANT CHANGE UP (ref 14–18)
MAGNESIUM SERPL-MCNC: 1.8 MG/DL — SIGNIFICANT CHANGE UP (ref 1.8–2.4)
MAGNESIUM SERPL-MCNC: 1.8 MG/DL — SIGNIFICANT CHANGE UP (ref 1.8–2.4)
MCHC RBC-ENTMCNC: 31.6 PG — HIGH (ref 27–31)
MCHC RBC-ENTMCNC: 31.6 PG — HIGH (ref 27–31)
MCHC RBC-ENTMCNC: 35.7 G/DL — SIGNIFICANT CHANGE UP (ref 32–37)
MCHC RBC-ENTMCNC: 35.7 G/DL — SIGNIFICANT CHANGE UP (ref 32–37)
MCV RBC AUTO: 88.6 FL — SIGNIFICANT CHANGE UP (ref 80–94)
MCV RBC AUTO: 88.6 FL — SIGNIFICANT CHANGE UP (ref 80–94)
NRBC # BLD: 0 /100 WBCS — SIGNIFICANT CHANGE UP (ref 0–0)
NRBC # BLD: 0 /100 WBCS — SIGNIFICANT CHANGE UP (ref 0–0)
PHOSPHATE SERPL-MCNC: 4.3 MG/DL — SIGNIFICANT CHANGE UP (ref 2.1–4.9)
PHOSPHATE SERPL-MCNC: 4.3 MG/DL — SIGNIFICANT CHANGE UP (ref 2.1–4.9)
PLATELET # BLD AUTO: 249 K/UL — SIGNIFICANT CHANGE UP (ref 130–400)
PLATELET # BLD AUTO: 249 K/UL — SIGNIFICANT CHANGE UP (ref 130–400)
PMV BLD: 9.7 FL — SIGNIFICANT CHANGE UP (ref 7.4–10.4)
PMV BLD: 9.7 FL — SIGNIFICANT CHANGE UP (ref 7.4–10.4)
POTASSIUM SERPL-MCNC: 4.7 MMOL/L — SIGNIFICANT CHANGE UP (ref 3.5–5)
POTASSIUM SERPL-MCNC: 4.7 MMOL/L — SIGNIFICANT CHANGE UP (ref 3.5–5)
POTASSIUM SERPL-SCNC: 4.7 MMOL/L — SIGNIFICANT CHANGE UP (ref 3.5–5)
POTASSIUM SERPL-SCNC: 4.7 MMOL/L — SIGNIFICANT CHANGE UP (ref 3.5–5)
RBC # BLD: 4.84 M/UL — SIGNIFICANT CHANGE UP (ref 4.7–6.1)
RBC # BLD: 4.84 M/UL — SIGNIFICANT CHANGE UP (ref 4.7–6.1)
RBC # FLD: 11.9 % — SIGNIFICANT CHANGE UP (ref 11.5–14.5)
RBC # FLD: 11.9 % — SIGNIFICANT CHANGE UP (ref 11.5–14.5)
SODIUM SERPL-SCNC: 135 MMOL/L — SIGNIFICANT CHANGE UP (ref 135–146)
SODIUM SERPL-SCNC: 135 MMOL/L — SIGNIFICANT CHANGE UP (ref 135–146)
WBC # BLD: 10.78 K/UL — SIGNIFICANT CHANGE UP (ref 4.8–10.8)
WBC # BLD: 10.78 K/UL — SIGNIFICANT CHANGE UP (ref 4.8–10.8)
WBC # FLD AUTO: 10.78 K/UL — SIGNIFICANT CHANGE UP (ref 4.8–10.8)
WBC # FLD AUTO: 10.78 K/UL — SIGNIFICANT CHANGE UP (ref 4.8–10.8)

## 2023-10-27 PROCEDURE — 99231 SBSQ HOSP IP/OBS SF/LOW 25: CPT

## 2023-10-27 PROCEDURE — 10060 I&D ABSCESS SIMPLE/SINGLE: CPT

## 2023-10-27 PROCEDURE — 99222 1ST HOSP IP/OBS MODERATE 55: CPT | Mod: 57

## 2023-10-27 RX ORDER — KETOROLAC TROMETHAMINE 30 MG/ML
15 SYRINGE (ML) INJECTION ONCE
Refills: 0 | Status: DISCONTINUED | OUTPATIENT
Start: 2023-10-27 | End: 2023-10-27

## 2023-10-27 RX ORDER — LIDOCAINE HCL 20 MG/ML
20 VIAL (ML) INJECTION ONCE
Refills: 0 | Status: DISCONTINUED | OUTPATIENT
Start: 2023-10-27 | End: 2023-10-28

## 2023-10-27 RX ORDER — LINEZOLID 600 MG/300ML
600 INJECTION, SOLUTION INTRAVENOUS EVERY 12 HOURS
Refills: 0 | Status: DISCONTINUED | OUTPATIENT
Start: 2023-10-27 | End: 2023-10-28

## 2023-10-27 RX ADMIN — Medication 100 MILLIGRAM(S): at 02:46

## 2023-10-27 RX ADMIN — Medication 1 PATCH: at 19:00

## 2023-10-27 RX ADMIN — Medication 1 PATCH: at 11:14

## 2023-10-27 RX ADMIN — Medication 100 MILLIGRAM(S): at 14:05

## 2023-10-27 RX ADMIN — LINEZOLID 300 MILLIGRAM(S): 600 INJECTION, SOLUTION INTRAVENOUS at 17:42

## 2023-10-27 RX ADMIN — Medication 15 MILLIGRAM(S): at 16:08

## 2023-10-27 NOTE — CONSULT NOTE ADULT - SUBJECTIVE AND OBJECTIVE BOX
Surgery CONSULT    Patient is a 28y old  Male who presents with a chief complaint of Cellulitis (26 Oct 2023 20:24)    HPI:  Patient is a 29yo male w/ no significant PMHx presenting w, worsening redness and swelling on his left arm. Report 1 week ago he noticed a small pimple/ingrown hair, he took his old scissors and tried to drain it, a little blood came out at time. Reports since than the redness and swelling has been worsening so he went to his PCP and was prescribed Keflex and Bactrim w/ no changes in wound. Reports serous drainage from the wound when pressing on it. Reports received his Tdap vaccine last year. Denies fever, chills, n/v, chest pain, sob, numbness, tingling, loss of sensation, changes in BMs, or any urinary sxs.     eval for I+D  on clinda IV     PAST MEDICAL & SURGICAL HISTORY:  No pertinent past medical history  No significant past surgical history    social hx :   Patient reports using a nicotine patch.  Reports quit smoking, denies drinking, or any illicit drug use.    Allergies:  No Known Allergies    MEDICATIONS  (STANDING):  acetaminophen     Tablet .. 650 every 6 hours PRN  enoxaparin Injectable 40 every 24 hours  melatonin 3 at bedtime PRN    Vital Signs Last 24 Hrs  T(F): 96.6 (10-27-23 @ 04:55), Max: 98.2 (10-26-23 @ 17:43)    Vital Signs Last 24 Hrs  HR: 79 (10-27-23 @ 04:55) (63 - 93)  BP: 114/69 (10-27-23 @ 04:55) (113/60 - 117/72)  RR: 18 (10-27-23 @ 04:55)  SpO2: 98% (10-26-23 @ 21:34) (97% - 100%)  Wt(kg): --    GENERAL:  27y/o Male NAD, resting comfortably.  HEAD:  Atraumatic, Normocephalic  CHEST/LUNG: Clear to auscultation bilaterally; No wheeze, rhonchi, or rales  HEART: Regular rate and rhythm; S1&S2  ABDOMEN: Soft, Nontender, Nondistended x 4 quadrants; Bowel sounds present  EXTREMITIES:   Peripheral Pulses Present, No clubbing, no cyanosis, or no edema, no calf tenderness  PSYCH: AAOx3, cooperative, appropriate  NEUROLOGY: WNL  SKIN: left forearm extensor surface w/ +abscess/indruation w/ surround erythema and warmth and mild ttp                        15.3   10.78 )-----------( 249      ( 27 Oct 2023 08:01 )             42.9     10-27    135  |  99  |  11  ----------------------------<  95  4.7   |  24  |  1.0    Ca    9.5      27 Oct 2023 08:01  Phos  4.3     10-27  Mg     1.8     10-27    TPro  6.9  /  Alb  4.7  /  TBili  1.1  /  DBili  x   /  AST  16  /  ALT  12  /  AlkPhos  66  10-26      WBC Trend:  WBC Count: 10.78 (10-27-23 @ 08:01)  WBC Count: 12.28 (10-26-23 @ 18:45)      Auto Neutrophil #: 9.21 K/uL (10-26-23 @ 18:45)      Trend LDH      Auto Eosinophil %: 0.5 % (10-26-23 @ 18:45)      Urinalysis Basic - ( 27 Oct 2023 08:01 )    Color: x / Appearance: x / SG: x / pH: x  Gluc: 95 mg/dL / Ketone: x  / Bili: x / Urobili: x   Blood: x / Protein: x / Nitrite: x   Leuk Esterase: x / RBC: x / WBC x   Sq Epi: x / Non Sq Epi: x / Bacteria: x    Lactate, Blood: 1.3 (10-26 @ 18:45)        RADIOLOGY:  imaging below personally reviewed    < from: Xray Forearm, Left (10.26.23 @ 17:51) >  Findings/  impression:    No evidence of acute fracture or dislocation.    Soft tissue swelling is noted of the proximal/mid forearm. No definite   subcutaneous emphysema.    < end of copied text >     Surgery CONSULT    Patient is a 28y old  Male who presents with a chief complaint of Cellulitis (26 Oct 2023 20:24)    HPI:  Patient is a 27yo male right hand dominant w/ no significant PMHx presenting w, worsening redness and swelling on his left arm. Report 1 week ago he noticed a small pimple/ingrown hair, he took his old scissors and tried to drain it, a little blood came out at time. Reports since than the redness and swelling has been worsening so he went to his PCP and was prescribed Keflex and Bactrim w/ no changes in wound. Reports serous drainage from the wound when pressing on it. Reports received his Tdap vaccine last year. Denies fever, chills, n/v, chest pain, sob, numbness, tingling, loss of sensation, changes in BMs, or any urinary sxs.     went to [facility] twice until decided to have ER eval due to increasing swelling and redness on po abx    eval for I+D  on clinda IV     PAST MEDICAL & SURGICAL HISTORY:  No pertinent past medical history  No significant past surgical history    social hx :   Patient reports using a nicotine patch.  Reports quit smoking, denies drinking, or any illicit drug use.    Allergies:  No Known Allergies    MEDICATIONS  (STANDING):  acetaminophen     Tablet .. 650 every 6 hours PRN  enoxaparin Injectable 40 every 24 hours  melatonin 3 at bedtime PRN    Vital Signs Last 24 Hrs  T(F): 96.6 (10-27-23 @ 04:55), Max: 98.2 (10-26-23 @ 17:43)    Vital Signs Last 24 Hrs  HR: 79 (10-27-23 @ 04:55) (63 - 93)  BP: 114/69 (10-27-23 @ 04:55) (113/60 - 117/72)  RR: 18 (10-27-23 @ 04:55)  SpO2: 98% (10-26-23 @ 21:34) (97% - 100%)  Wt(kg): --    GENERAL:  27y/o Male NAD, resting comfortably.  HEAD:  Atraumatic, Normocephalic  CHEST/LUNG: Clear to auscultation bilaterally; No wheeze, rhonchi, or rales  HEART: Regular rate and rhythm; S1&S2  ABDOMEN: Soft, Nontender, Nondistended x 4 quadrants; Bowel sounds present  EXTREMITIES:   Peripheral Pulses Present, No clubbing, no cyanosis, or no edema, no calf tenderness  PSYCH: AAOx3, cooperative, appropriate  NEUROLOGY: WNL  SKIN: left forearm extensor surface w/ +abscess/indruation w/ surround erythema and warmth and mild ttp                        15.3   10.78 )-----------( 249      ( 27 Oct 2023 08:01 )             42.9     10-27    135  |  99  |  11  ----------------------------<  95  4.7   |  24  |  1.0    Ca    9.5      27 Oct 2023 08:01  Phos  4.3     10-27  Mg     1.8     10-27    TPro  6.9  /  Alb  4.7  /  TBili  1.1  /  DBili  x   /  AST  16  /  ALT  12  /  AlkPhos  66  10-26      WBC Trend:  WBC Count: 10.78 (10-27-23 @ 08:01)  WBC Count: 12.28 (10-26-23 @ 18:45)      Auto Neutrophil #: 9.21 K/uL (10-26-23 @ 18:45)      Trend LDH      Auto Eosinophil %: 0.5 % (10-26-23 @ 18:45)      Urinalysis Basic - ( 27 Oct 2023 08:01 )    Color: x / Appearance: x / SG: x / pH: x  Gluc: 95 mg/dL / Ketone: x  / Bili: x / Urobili: x   Blood: x / Protein: x / Nitrite: x   Leuk Esterase: x / RBC: x / WBC x   Sq Epi: x / Non Sq Epi: x / Bacteria: x    Lactate, Blood: 1.3 (10-26 @ 18:45)        RADIOLOGY:  imaging below personally reviewed    < from: Xray Forearm, Left (10.26.23 @ 17:51) >  Findings/  impression:    No evidence of acute fracture or dislocation.    Soft tissue swelling is noted of the proximal/mid forearm. No definite   subcutaneous emphysema.    < end of copied text >

## 2023-10-27 NOTE — PROGRESS NOTE ADULT - SUBJECTIVE AND OBJECTIVE BOX
SUBJECTIVE:    Patient is a 28y old Male who presents with a chief complaint of Cellulitis (27 Oct 2023 13:43)    Currently admitted to medicine with the primary diagnosis of Cellulitis of forearm    Interval history:     Overnight events noted. left forearm swelling / erythema improved.    Admit Diagnosis:  Cellulitis of extremity        PAST MEDICAL & SURGICAL HISTORY  No pertinent past medical history    No significant past surgical history    No pertinent past medical history    No significant past surgical history        SOCIAL HISTORY:  Negative for smoking/alcohol/drug use.     ALLERGIES:  No Known Allergies      PHYSICAL EXAM:  GEN: No acute distress  HEAD: atraumatic, normocephalic  EYE: Pink conjunctiva, EOMI  ENT: moist mucus edema  LUNGS: Clear to auscultation bilaterally   HEART: S1/S2  ABD: Soft, NT/ND. BS +  EXT: no cyanosis/edema  NEURO: AAOX3  SKIN: erythematous, indurated area over left forearm with warmth and tenderness on palpation    Intravenous access:   NG tube:   Sauer Catheter:     VITALS:   Vital Signs Last 24 Hrs  T(C): 36 (27 Oct 2023 13:08), Max: 36.8 (26 Oct 2023 17:43)  T(F): 96.8 (27 Oct 2023 13:08), Max: 98.2 (26 Oct 2023 17:43)  HR: 69 (27 Oct 2023 13:08) (63 - 93)  BP: 106/52 (27 Oct 2023 13:08) (106/52 - 117/72)  RR: 18 (27 Oct 2023 13:08) (18 - 18)  SpO2: 97% (27 Oct 2023 13:08) (97% - 100%)    Parameters below as of 27 Oct 2023 13:08  Patient On (Oxygen Delivery Method): room air      I&Os:    MEDICATIONS:  STANDING:  chlorhexidine 2% Cloths 1 Application(s) Topical <User Schedule>, 10-26-23 @ 21:02  enoxaparin Injectable 40 milliGRAM(s) SubCutaneous every 24 hours, 10-26-23 @ 21:02  lidocaine 1% Injectable 20 milliLiter(s) Local Injection once, 10-27-23 @ 15:21  linezolid  IVPB 600 milliGRAM(s) IV Intermittent every 12 hours, 10-27-23 @ 14:07  nicotine -   7 mG/24Hr(s) Patch 1 Patch Transdermal daily, 10-26-23 @ 22:35      PRN:  acetaminophen     Tablet .. 650 milliGRAM(s) Oral every 6 hours, 10-26-23 @ 21:01 PRN  melatonin 3 milliGRAM(s) Oral at bedtime, 10-26-23 @ 21:01 PRN    Diet, Regular (10-26-23 @ 21:02) [Active]    LABS:                                            15.3                  Neurophils% (auto):   x      (10-27 @ 08:01):    10.78)-----------(249          Lymphocytes% (auto):  x                                             42.9                   Eosinphils% (auto):   x        Manual%: Neutrophils x    ; Lymphocytes x    ; Eosinophils x    ; Bands%: x    ; Blasts x          WBC trend: 10.78 <--, 12.28 <--  Hgb: 15.3 [10-27-23 @ 08:01]<--, 15.6 [10-26-23 @ 18:45]<--                            135    |  99     |  11                  Calcium: 9.5   / iCa: x      (10-27 @ 08:01)    ----------------------------<  95        Magnesium: 1.8                              4.7     |  24     |  1.0              Phosphorous: 4.3      TPro  6.9    /  Alb  4.7    /  TBili  1.1    /  DBili  x      /  AST  16     /  ALT  12     /  AlkPhos  66     26 Oct 2023 18:45    Creatinine trend: 1.0<--, 1.0<--  SODIUM TREND: Sodium 135 [10-27 @ 08:01]<--, Sodium 136 [10-26 @ 18:45]<--    POC Glucose:       Urinalysis Basic - ( 27 Oct 2023 08:01 )    Color: x / Appearance: x / SG: x / pH: x  Gluc: 95 mg/dL / Ketone: x  / Bili: x / Urobili: x   Blood: x / Protein: x / Nitrite: x   Leuk Esterase: x / RBC: x / WBC x   Sq Epi: x / Non Sq Epi: x / Bacteria: x      RADIOLOGY:  < from: Xray Forearm, Left (10.26.23 @ 17:51) >  No evidence of acute fracture or dislocation.    Soft tissue swelling is noted of the proximal/mid forearm. No definite   subcutaneous emphysema.    < end of copied text >

## 2023-10-27 NOTE — PROGRESS NOTE ADULT - ASSESSMENT
Patient is a 27yo male w/ no significant PMHx presenting w, worsening redness and swelling on his left arm. Report 1 week ago he noticed a small pimple/ingrown hair, he took his old scissors and tried to drain it, a little blood came out at time. Reports since than the redness and swelling has been worsening so he went to his PCP and was prescribed Keflex and Bactrim w/ no changes in wound. Reports serous drainage from the wound when pressing on it. Reports received his Tdap vaccine last year. Denies fever, chills, n/v, chest pain, sob, numbness, tingling, loss of sensation, changes in BMs, or any urinary sxs.     #Cellulitis of forearm    - Afebrile, WBC 12.82, Lactate 1.3, left forearm extensor surface w/ +abscess/indruation w/ surround erythema and warmth and mild ttp  - IV abx  - Pain control prn  - FU Blood Cx  - ID Consult   - Surgery eval for I&D      Diet:  Regular   Activity: AAT   VTE PPX: Lovenox   Code Status: Full Code     DISPO: anticipate for tomorrow

## 2023-10-27 NOTE — CONSULT NOTE ADULT - NSCONSULTADDITIONALINFOA_GEN_ALL_CORE
I have seen and examined the patient.  I agree with the above assessment plan.  I performed I&D at the bedside.  Please see the associated note.  Continue oral antibiotics.  Discharge planning.  Follow-up in my office on Friday.  Thank you

## 2023-10-27 NOTE — PROCEDURE NOTE - NSLEVEL_SKIN_A_CORE
Thank you for being able to meet over video today!  Below is a brief summary of our plan.    #acute abdominal pain--  #nausea/vomiting--  -Possible etiologies include    *medication effects (although now off metoprolol and trileptal, with no significant change in symptoms, and had previously tapered off seroquel prior to symptom onset),    *mood concerns (although with abrupt onset would seem less likely without other obvious changes),   *constipation (feels more constipated at times, but endorses fairly regular soft stools and no acute changes in stooling over last 2-3wks),    *GERD/gastritis (a more likely possibility given N/V, overnight symptoms, pain with eating/drinking, etc., although felt like H2RA therapy with Pepcid and intermittent Tums usage not helpful),   *stomach ulcer (infectious or related to increased stress or increased stomach acid production; given nausea/vomiting, overnight symptoms, pain with eating/drinking).    -Will plan to arrange for endoscopy to evaluate for infectious, inflammatory, allergic, ulcerative disease among others.  Our  will call to get his set up with family, likely by Monday 9/14.  Hopefully to be arranged sometime next week with either myself or one of my colleagues.    -In the meantime, plan to start trial of PPI therapy with omeprazole 40mg daily on an empty stomach 20 min or so before breakfast.  This should not significantly impact histologic findings if EGD able to be arranged in approx 1wk or so.  Camille has full physical exam from ER visit on 9/6 that should serve as a pre-op.    -Discussed that this would likely take 1-3wks to reach full effect and note if it is making any difference.  If somewhat helpful, but seems to wear off, may try 20mg 2x/day instead or doing 40mg at bedtime rather than in the morning.  Okay to continue symptomatic management in the meantime, although she had not felt that Tums, Pepcid, or Zofran had been particularly helpful.   Discussed sipping water, chewing gum, or eating ginger if nausea/vomiting symptoms.  Tylenol/Advil may not be particularly helpful for abdominal pain.  Okay to still try Tums or Pepcid before bed.  Okay to still try zofran if persistent vomiting.  Okay to try hot or cold packs on stomach for pain as well.    -While vomiting reported as teal concerning for bilious vomiting, she does not otherwise appear to endorse obstructive symptoms.  Consider special x-ray study (an x-ray upper GI series) if emesis of this color is ongoing and associated with poor tolerance of oral intake or worsening pain.    #chronic joint pain--  -Discussed and placed rheumatology referral given reports of joint pain as well as ankle swelling.    Orders today--  Orders Placed This Encounter   Procedures     RHEUMATOLOGY PEDS REFERRAL       Follow up: TBD based on symptoms and endoscopy findings. I placed a generic 3 month follow-up request at this time.  Please call or return sooner should Camille become symptomatic.      Thank you for allowing me to participate in Camille's care.     If you have any questions during regular office hours or urgent questions/concerns, please contact the nurse line at [phone].   [employer] messages are for routine communication/questions and are usually answered in 2-3 business days.  If acute concerns arise after hours, you can call [phone] and ask to speak to the pediatric gastroenterologist on call.    If you have scheduling needs, please call the Call Center at [phone].  If you need to set up a radiology test, please call [phone].   Outside lab and imaging results should be faxed to [phone].    Thank you!  Dr Theo Venegas MD MPH    Pediatric Gastroenterology, Hepatology, and Nutrition  [facility]     subcutaneous

## 2023-10-27 NOTE — PROCEDURE NOTE - ADDITIONAL PROCEDURE DETAILS
Packed daily with a corner of gauze.  Continue oral antibiotics.  Follow-up Dr. Hamilton 1 week in office.

## 2023-10-27 NOTE — CONSULT NOTE ADULT - SUBJECTIVE AND OBJECTIVE BOX
INFECTIOUS DISEASE CONSULT NOTE    Patient is a 28y old  Male who presents with a chief complaint of Cellulitis (26 Oct 2023 20:24)    HPI:  Patient is a 27yo male w/ no significant PMHx presenting w, worsening redness and swelling on his left arm. Report 1 week ago he noticed a small pimple/ingrown hair, he took his old scissors and tried to drain it, a little blood came out at time. Reports since than the redness and swelling has been worsening so he went to his PCP and was prescribed Keflex and Bactrim w/ no changes in wound. Reports serous drainage from the wound when pressing on it. Reports received his Tdap vaccine last year. Denies fever, chills, n/v, chest pain, sob, numbness, tingling, loss of sensation, changes in BMs, or any urinary sxs.      (26 Oct 2023 20:24)         Prior hospital charts reviewed [Yes]  Primary team notes reviewed [Yes]  Other consultant notes reviewed [Yes]    REVIEW OF SYSTEMS:      PAST MEDICAL & SURGICAL HISTORY:  No pertinent past medical history      No significant past surgical history          SOCIAL HISTORY:  - Born in _____, migrated to  in 19XX  - Currently working as / Retired  - Lives with _____; no pets  - No recent travel  - Denies tobacco use  - Denies alcohol use  - Denies illicit drug use  - Currently sexually active, has one male/female sexual partner    FAMILY HISTORY:      Allergies:  No Known Allergies      ANTIMICROBIALS:  clindamycin IVPB    clindamycin IVPB 600 every 8 hours      ANTIMICROBIALS (past 90 days):  MEDICATIONS  (STANDING):  clindamycin IVPB   100 mL/Hr IV Intermittent (10-26-23 @ 18:39)    clindamycin IVPB   100 mL/Hr IV Intermittent (10-27-23 @ 02:46)        OTHER MEDS:   MEDICATIONS  (STANDING):  acetaminophen     Tablet .. 650 every 6 hours PRN  enoxaparin Injectable 40 every 24 hours  melatonin 3 at bedtime PRN      VITALS:  Vital Signs Last 24 Hrs  T(F): 96.6 (10-27-23 @ 04:55), Max: 98.2 (10-26-23 @ 17:43)    Vital Signs Last 24 Hrs  HR: 79 (10-27-23 @ 04:55) (63 - 93)  BP: 114/69 (10-27-23 @ 04:55) (113/60 - 117/72)  RR: 18 (10-27-23 @ 04:55)  SpO2: 98% (10-26-23 @ 21:34) (97% - 100%)  Wt(kg): --    EXAM:    Labs:                        15.3   10.78 )-----------( 249      ( 27 Oct 2023 08:01 )             42.9     10-27    135  |  99  |  11  ----------------------------<  95  4.7   |  24  |  1.0    Ca    9.5      27 Oct 2023 08:01  Phos  4.3     10-27  Mg     1.8     10-27    TPro  6.9  /  Alb  4.7  /  TBili  1.1  /  DBili  x   /  AST  16  /  ALT  12  /  AlkPhos  66  10-26      WBC Trend:  WBC Count: 10.78 (10-27-23 @ 08:01)  WBC Count: 12.28 (10-26-23 @ 18:45)      Auto Neutrophil #: 9.21 K/uL (10-26-23 @ 18:45)      Creatine Trend:  Creatinine: 1.0 (10-27)  Creatinine: 1.0 (10-26)      Liver Biochemical Testing Trend:  Alanine Aminotransferase (ALT/SGPT): 12 (10-26)  Aspartate Aminotransferase (AST/SGOT): 16 (10-26-23 @ 18:45)  Bilirubin Total: 1.1 (10-26)      Trend LDH      Auto Eosinophil %: 0.5 % (10-26-23 @ 18:45)      Urinalysis Basic - ( 27 Oct 2023 08:01 )    Color: x / Appearance: x / SG: x / pH: x  Gluc: 95 mg/dL / Ketone: x  / Bili: x / Urobili: x   Blood: x / Protein: x / Nitrite: x   Leuk Esterase: x / RBC: x / WBC x   Sq Epi: x / Non Sq Epi: x / Bacteria: x        MICROBIOLOGY:      Lactate, Blood: 1.3 (10-26 @ 18:45)        RADIOLOGY:  imaging below personally reviewed    < from: Xray Forearm, Left (10.26.23 @ 17:51) >  Findings/  impression:    No evidence of acute fracture or dislocation.    Soft tissue swelling is noted of the proximal/mid forearm. No definite   subcutaneous emphysema.    < end of copied text >   INFECTIOUS DISEASE CONSULT NOTE    Patient is a 28y old  Male who presents with a chief complaint of Cellulitis (26 Oct 2023 20:24)    HPI:  Patient is a 29yo male w/ no significant PMHx presenting w, worsening redness and swelling on his left arm. Report 1 week ago he noticed a small pimple/ingrown hair, he took his old scissors and tried to drain it, a little blood came out at time. Reports since than the redness and swelling has been worsening so he went to his PCP and was prescribed Keflex and Bactrim w/ no changes in wound. Reports serous drainage from the wound when pressing on it. Reports received his Tdap vaccine last year. Denies fever, chills, n/v, chest pain, sob, numbness, tingling, loss of sensation, changes in BMs, or any urinary sxs.      (26 Oct 2023 20:24)      Prior hospital charts reviewed [Yes]  Primary team notes reviewed [Yes]  Other consultant notes reviewed [Yes]    REVIEW OF SYSTEMS:  CONSTITUTIONAL: No fever or chills  HEAD: No lesion on scalp  EYES: No visual disturbance  ENT: No sore throat  RESPIRATORY: No cough, no shortness of breath  CARDIOVASCULAR: No chest pain or palpitations  GASTROINTESTINAL: No abdominal or epigastric pain  GENITOURINARY: No dysuria  NEUROLOGICAL: No headache/dizziness  MUSCULOSKELETAL: No joint pain, erythema, or swelling; no back pain  SKIN: Area of erythema and induration left forearm  All other ROS negative except noted above        PAST MEDICAL & SURGICAL HISTORY:  No pertinent past medical history      No significant past surgical history          SOCIAL HISTORY:  - No recent travel  - Denies tobacco use  - Denies alcohol use  - Denies illicit drug use    FAMILY HISTORY:  Mother, alive, had CVA    Allergies:  No Known Allergies      ANTIMICROBIALS:  clindamycin IVPB    clindamycin IVPB 600 every 8 hours      ANTIMICROBIALS (past 90 days):  MEDICATIONS  (STANDING):  clindamycin IVPB   100 mL/Hr IV Intermittent (10-26-23 @ 18:39)    clindamycin IVPB   100 mL/Hr IV Intermittent (10-27-23 @ 02:46)        OTHER MEDS:   MEDICATIONS  (STANDING):  acetaminophen     Tablet .. 650 every 6 hours PRN  enoxaparin Injectable 40 every 24 hours  melatonin 3 at bedtime PRN      VITALS:  Vital Signs Last 24 Hrs  T(F): 96.6 (10-27-23 @ 04:55), Max: 98.2 (10-26-23 @ 17:43)    Vital Signs Last 24 Hrs  HR: 79 (10-27-23 @ 04:55) (63 - 93)  BP: 114/69 (10-27-23 @ 04:55) (113/60 - 117/72)  RR: 18 (10-27-23 @ 04:55)  SpO2: 98% (10-26-23 @ 21:34) (97% - 100%)  Wt(kg): --    EXAM:  GENERAL: NAD, lying in bed  HEAD: No head lesions  EYES: Conjunctiva pink and cornea white  EAR, NOSE, MOUTH, THROAT: Normal external ears and nose, no discharges; moist mucous membranes  NECK: Supple, nontender to palpation; no JVD  RESPIRATORY: Clear to auscultation bilaterally  CARDIOVASCULAR: S1 S2  GASTROINTESTINAL: Soft, nontender, nondistended; normoactive bowel sounds  EXTREMITIES: No clubbing, cyanosis, or petal edema  NERVOUS SYSTEM: Alert and oriented to person, time, place and situation, speech clear. No focal deficits   MUSCULOSKELETAL: No joint erythema, swelling or pain  SKIN: Area of induration and erythema on left forearm, with visible purulence, no joint involvement, no superficial thrombophlebitis  PSYCH: Normal affect      Labs:                        15.3   10.78 )-----------( 249      ( 27 Oct 2023 08:01 )             42.9     10-27    135  |  99  |  11  ----------------------------<  95  4.7   |  24  |  1.0    Ca    9.5      27 Oct 2023 08:01  Phos  4.3     10-27  Mg     1.8     10-27    TPro  6.9  /  Alb  4.7  /  TBili  1.1  /  DBili  x   /  AST  16  /  ALT  12  /  AlkPhos  66  10-26      WBC Trend:  WBC Count: 10.78 (10-27-23 @ 08:01)  WBC Count: 12.28 (10-26-23 @ 18:45)      Auto Neutrophil #: 9.21 K/uL (10-26-23 @ 18:45)      Creatine Trend:  Creatinine: 1.0 (10-27)  Creatinine: 1.0 (10-26)      Liver Biochemical Testing Trend:  Alanine Aminotransferase (ALT/SGPT): 12 (10-26)  Aspartate Aminotransferase (AST/SGOT): 16 (10-26-23 @ 18:45)  Bilirubin Total: 1.1 (10-26)      Trend LDH      Auto Eosinophil %: 0.5 % (10-26-23 @ 18:45)      Urinalysis Basic - ( 27 Oct 2023 08:01 )    Color: x / Appearance: x / SG: x / pH: x  Gluc: 95 mg/dL / Ketone: x  / Bili: x / Urobili: x   Blood: x / Protein: x / Nitrite: x   Leuk Esterase: x / RBC: x / WBC x   Sq Epi: x / Non Sq Epi: x / Bacteria: x        MICROBIOLOGY:      Lactate, Blood: 1.3 (10-26 @ 18:45)        RADIOLOGY:  imaging below personally reviewed    < from: Xray Forearm, Left (10.26.23 @ 17:51) >  Findings/  impression:    No evidence of acute fracture or dislocation.    Soft tissue swelling is noted of the proximal/mid forearm. No definite   subcutaneous emphysema.    < end of copied text >

## 2023-10-27 NOTE — CONSULT NOTE ADULT - ASSESSMENT
27yo male w/ no significant PMHx presenting w, worsening redness and swelling on his left arm. Report 1 week ago he noticed a small pimple/ingrown hair, he took his old scissors and tried to drain it, a little blood came out at time. Reports since than the redness and swelling has been worsening so he went to his PCP and was prescribed Keflex and Bactrim w/ no changes in wound. Reports serous drainage from the wound when pressing on it. Reports received his Tdap vaccine last year. Denies fever, chills, n/v, chest pain, sob, numbness, tingling, loss of sensation, changes in BMs, or any urinary sxs.     eval for I+D  on clinda IV        27yo male w/ no significant PMHx presenting w, worsening redness and swelling on his left arm. Report 1 week ago he noticed a small pimple/ingrown hair, he took his old scissors and tried to drain it, a little blood came out at time. Reports since than the redness and swelling has been worsening so he went to his PCP and was prescribed Keflex and Bactrim w/ no changes in wound. Reports serous drainage from the wound when pressing on it. Reports received his Tdap vaccine last year. Denies fever, chills, n/v, chest pain, sob, numbness, tingling, loss of sensation, changes in BMs, or any urinary sxs.     eval for I+D  on clinda IV     STABLE - NON septic appearance    # cellulitis of left forearm ( non dominate arm )  - us of arm to eval abscess   - iv and po abx as per id   - elevate as much as possible  - f/u cx   - trend wbc and fever     d/w dr guzman    29yo male w/ no significant PMHx presenting w, worsening redness and swelling on his left arm. Report 1 week ago he noticed a small pimple/ingrown hair, he took his old scissors and tried to drain it, a little blood came out at time. Reports since than the redness and swelling has been worsening so he went to his PCP and was prescribed Keflex and Bactrim w/ no changes in wound. Reports serous drainage from the wound when pressing on it. Reports received his Tdap vaccine last year. Denies fever, chills, n/v, chest pain, sob, numbness, tingling, loss of sensation, changes in BMs, or any urinary sxs.     eval for I+D  on clinda IV     STABLE - NON septic appearance    # cellulitis of left forearm ( non-dominant arm)  - iv and po abx as per id   - I&D pre Joy  - elevate as much as possible  - f/u cx   - trend wbc and fever     d/w dr guzman

## 2023-10-27 NOTE — CONSULT NOTE ADULT - ASSESSMENT
27yo male w/ no significant PMHx presenting w, worsening redness and swelling on his left arm.    ID is consulted for cellulitis  Afebrile, with leukocytosis  Was on Keflex and Bactrim but with no improvement  BCx pending    Antibiotics:  Clindamycin 10/26 ->      IMPRESSION:      RECOMMENDATIONS:      * THIS IS AN INCOMPLETE NOTE. FINAL RECOMMENDATION IS PENDING *     27yo male w/ no significant PMHx presenting w, worsening redness and swelling on his left arm.    ID is consulted for cellulitis  Afebrile, with leukocytosis  Was on Keflex and Bactrim but with no improvement  BCx pending    Antibiotics:  Clindamycin 10/26 ->      IMPRESSION:  Purulence cellulitis  Subcutaneous abscess  Leukocytosis    RECOMMENDATIONS:  - D/C clindamycin, start IV/PO linezolid 600mg q12hrs  - Surgery consult for I&D  - Follow up BCx  - Trend WBC, fever curve, transaminases, creatinine daily      Emily Vazquez D.O.  Attending Physician  Division of Infectious Diseases  [facility]  Please contact me via [employer] Teams

## 2023-10-28 ENCOUNTER — TRANSCRIPTION ENCOUNTER (OUTPATIENT)
Age: 28
End: 2023-10-28

## 2023-10-28 VITALS
RESPIRATION RATE: 18 BRPM | TEMPERATURE: 96 F | HEART RATE: 62 BPM | SYSTOLIC BLOOD PRESSURE: 124 MMHG | DIASTOLIC BLOOD PRESSURE: 58 MMHG

## 2023-10-28 PROCEDURE — 99239 HOSP IP/OBS DSCHRG MGMT >30: CPT

## 2023-10-28 RX ORDER — LINEZOLID 600 MG/300ML
1 INJECTION, SOLUTION INTRAVENOUS
Qty: 12 | Refills: 0
Start: 2023-10-28 | End: 2023-11-02

## 2023-10-28 RX ADMIN — Medication 1 PATCH: at 07:44

## 2023-10-28 RX ADMIN — Medication 1 PATCH: at 12:30

## 2023-10-28 RX ADMIN — LINEZOLID 300 MILLIGRAM(S): 600 INJECTION, SOLUTION INTRAVENOUS at 06:11

## 2023-10-28 NOTE — DISCHARGE NOTE NURSING/CASE MANAGEMENT/SOCIAL WORK - NSDCPEFALRISK_GEN_ALL_CORE
For information on Fall & Injury Prevention, visit: [email or web address] OR  [email or web address] OR  https://www.cdc.gov/steadi/patient.html
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
## 2023-10-28 NOTE — DISCHARGE NOTE PROVIDER - NPI NUMBER (FOR SYSADMIN USE ONLY) :
[[id number]] [[id number]],[[id number]] Area H Indication Text: Tumors in this location are included in Area H (eyelids, eyebrows, nose, lips, chin, ear, pre-auricular, post-auricular, temple, genitalia, hands, feet, ankles and areola).  Tissue conservation is critical in these anatomic locations.

## 2023-10-28 NOTE — DISCHARGE NOTE PROVIDER - HOSPITAL COURSE
Patient is a 29yo male w/ no significant PMHx presenting w, worsening redness and swelling on his left arm. Report 1 week ago he noticed a small pimple/ingrown hair, he took his old scissors and tried to drain it, a little blood came out at time. Reports since than the redness and swelling has been worsening so he went to his PCP and was prescribed Keflex and Bactrim w/ no changes in wound. Reports serous drainage from the wound when pressing on it. Reports received his Tdap vaccine last year. Denies fever, chills, n/v, chest pain, sob, numbness, tingling, loss of sensation, changes in BMs, or any urinary sxs.     Pt was admitted for cellulitis and abscess and started on IV antibx.    Pt was seen by ID who recommended stopping Clinda and starting linezolid.    Pt was seen by surgery and a bedside I&D was performed.   Pt clinically improved. Leukocytosis resolved, blood cxs were negative to date.     Pt was stable for DC with PO antibx  FUP PMD Patient is a 29yo male w/ no significant PMHx presenting w, worsening redness and swelling on his left arm. Report 1 week ago he noticed a small pimple/ingrown hair, he took his old scissors and tried to drain it, a little blood came out at time. Reports since than the redness and swelling has been worsening so he went to his PCP and was prescribed Keflex and Bactrim w/ no changes in wound. Reports serous drainage from the wound when pressing on it. Reports received his Tdap vaccine last year. Denies fever, chills, n/v, chest pain, sob, numbness, tingling, loss of sensation, changes in BMs, or any urinary sxs.     Pt was admitted for cellulitis and abscess and started on IV antibx.    Pt was seen by ID who recommended stopping Clinda and starting linezolid.    Pt was seen by surgery and a bedside I&D was performed.   Pt clinically improved. Leukocytosis resolved, blood cxs were negative to date.     Pt was stable for DC with PO antibiotics.  out-patient follow up with PMD and surgeon

## 2023-10-28 NOTE — DISCHARGE NOTE PROVIDER - NSDCCPCAREPLAN_GEN_ALL_CORE_FT
PRINCIPAL DISCHARGE DIAGNOSIS  Diagnosis: Cellulitis of forearm  Assessment and Plan of Treatment: s/p I&D  complete course of antibiotic  fup PMD     PRINCIPAL DISCHARGE DIAGNOSIS  Diagnosis: Cellulitis of forearm  Assessment and Plan of Treatment: s/p I&D by surgery team. Treated with IV linezolid. sypmtoms improved.  you are being discharged home on oral linezolid to complete 7 day antibiotic course.  Please take your medications as prescribed and follow up with your PMD and Dr. Hamilton in 1-2 weeks after discharge.

## 2023-10-28 NOTE — DISCHARGE NOTE PROVIDER - CARE PROVIDER_API CALL
Mckenna Norton  Internal Medicine  [address] [place] [address]  Phone: [phone]  Fax: [phone]  Follow Up Time: 1-3 days   Mckenna Norton S  Internal Medicine  [address] [place] [address]  Phone: [phone]  Fax: [phone]  Follow Up Time: 1-3 days    Donaldo Hamilton  Surgery  [address] [place] [address]  Phone: [phone]  Fax: [phone]  Follow Up Time:

## 2023-10-28 NOTE — DISCHARGE NOTE PROVIDER - PROVIDER TOKENS
PROVIDER:[TOKEN:[[id number]],FOLLOWUP:[1-3 days]] PROVIDER:[TOKEN:[[id number]],FOLLOWUP:[1-3 days]],PROVIDER:[TOKEN:[[id number]]]

## 2023-10-28 NOTE — DISCHARGE NOTE NURSING/CASE MANAGEMENT/SOCIAL WORK - PATIENT PORTAL LINK FT
You can access the FollowMyHealth Patient Portal offered by [facility] by registering at the following website: [email or web address]. By joining [employer]’s FollowMyHealth portal, you will also be able to view your health information using other applications (apps) compatible with our system.

## 2023-10-28 NOTE — DISCHARGE NOTE PROVIDER - ATTENDING DISCHARGE PHYSICAL EXAMINATION:
VITALS:  T(F): 96 (10-28-23 @ 05:10), Max: 97.7 (10-27-23 @ 21:08)  HR: 58 (10-28-23 @ 05:10) (58 - 73)  BP: 90/52 (10-28-23 @ 05:10) (90/52 - 118/62)  RR: 18 (10-28-23 @ 05:10) (18 - 18)  SpO2: 97% (10-28-23 @ 05:10) (97% - 97%)      PHYSICAL EXAM:  GEN: No acute distress  HEAD: atraumatic, normocephalic  EYE: Pink conjunctiva, EOMI  ENT: moist mucus edema  LUNGS: Clear to auscultation bilaterally   HEART: S1/S2  ABD: Soft, NT/ND. BS +  EXT: no cyanosis/edema  NEURO: AAOX3  SKIN: dressing on left forearm. s/p I&D of left forearm abscess

## 2023-11-01 DIAGNOSIS — L02.414 CUTANEOUS ABSCESS OF LEFT UPPER LIMB: ICD-10-CM

## 2023-11-01 DIAGNOSIS — L03.114 CELLULITIS OF LEFT UPPER LIMB: ICD-10-CM

## 2023-11-01 DIAGNOSIS — D72.829 ELEVATED WHITE BLOOD CELL COUNT, UNSPECIFIED: ICD-10-CM

## 2023-11-01 PROBLEM — Z00.00 ENCOUNTER FOR PREVENTIVE HEALTH EXAMINATION: Status: ACTIVE | Noted: 2023-11-01

## 2023-11-01 LAB
CULTURE RESULTS: SIGNIFICANT CHANGE UP
CULTURE RESULTS: SIGNIFICANT CHANGE UP
SPECIMEN SOURCE: SIGNIFICANT CHANGE UP
SPECIMEN SOURCE: SIGNIFICANT CHANGE UP

## 2023-11-03 ENCOUNTER — APPOINTMENT (OUTPATIENT)
Dept: SURGERY | Facility: CLINIC | Age: 28
End: 2023-11-03
Payer: COMMERCIAL

## 2023-11-03 VITALS
DIASTOLIC BLOOD PRESSURE: 70 MMHG | SYSTOLIC BLOOD PRESSURE: 102 MMHG | HEART RATE: 55 BPM | HEIGHT: 72 IN | BODY MASS INDEX: 25.06 KG/M2 | OXYGEN SATURATION: 98 % | WEIGHT: 185 LBS

## 2023-11-03 PROCEDURE — 99212 OFFICE O/P EST SF 10 MIN: CPT | Mod: 25

## 2023-11-03 PROCEDURE — 17250 CHEM CAUT OF GRANLTJ TISSUE: CPT | Mod: 58

## 2023-11-17 ENCOUNTER — RESULT REVIEW (OUTPATIENT)
Age: 28
End: 2023-11-17

## 2023-11-17 ENCOUNTER — APPOINTMENT (OUTPATIENT)
Dept: SURGERY | Facility: CLINIC | Age: 28
End: 2023-11-17
Payer: COMMERCIAL

## 2023-11-17 VITALS — OXYGEN SATURATION: 98 % | BODY MASS INDEX: 24.38 KG/M2 | HEART RATE: 63 BPM | WEIGHT: 180 LBS | HEIGHT: 72 IN

## 2023-11-17 DIAGNOSIS — S41.132D: ICD-10-CM

## 2023-11-17 PROCEDURE — 21930 EXC BACK LES SC < 3 CM: CPT

## 2023-11-17 PROCEDURE — 99213 OFFICE O/P EST LOW 20 MIN: CPT | Mod: 57

## 2023-12-01 ENCOUNTER — APPOINTMENT (OUTPATIENT)
Dept: SURGERY | Facility: CLINIC | Age: 28
End: 2023-12-01
Payer: COMMERCIAL

## 2023-12-01 DIAGNOSIS — L72.3 SEBACEOUS CYST: ICD-10-CM

## 2023-12-01 PROCEDURE — 99211 OFF/OP EST MAY X REQ PHY/QHP: CPT | Mod: 24

## 2024-04-04 ENCOUNTER — APPOINTMENT (OUTPATIENT)
Dept: ORTHOPEDIC SURGERY | Facility: CLINIC | Age: 29
End: 2024-04-04

## 2024-04-04 ENCOUNTER — TRANSCRIPTION ENCOUNTER (OUTPATIENT)
Age: 29
End: 2024-04-04

## 2024-04-04 ENCOUNTER — APPOINTMENT (OUTPATIENT)
Dept: ORTHOPEDIC SURGERY | Facility: CLINIC | Age: 29
End: 2024-04-04
Payer: COMMERCIAL

## 2024-04-04 VITALS — BODY MASS INDEX: 25.2 KG/M2 | WEIGHT: 180 LBS | HEIGHT: 71 IN

## 2024-04-04 PROCEDURE — 73080 X-RAY EXAM OF ELBOW: CPT | Mod: LT

## 2024-04-04 PROCEDURE — 99203 OFFICE O/P NEW LOW 30 MIN: CPT

## 2024-04-04 NOTE — DISCUSSION/SUMMARY
[de-identified] : Impression: Left elbow injury questionable triceps tendon injury.  Plan: Patient was advised to avoid any extraneous activity. Patient was advised for an MRI of the left elbow to evaluate a possible triceps tendon tear.  Follow-up: 2 weeks for repeat evaluation with Dr. Brown or Dr. Iqbal.
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
## 2024-04-10 ENCOUNTER — APPOINTMENT (OUTPATIENT)
Dept: MRI IMAGING | Facility: CLINIC | Age: 29
End: 2024-04-10
Payer: COMMERCIAL

## 2024-04-10 PROCEDURE — 73221 MRI JOINT UPR EXTREM W/O DYE: CPT | Mod: LT

## 2024-04-16 ENCOUNTER — APPOINTMENT (OUTPATIENT)
Dept: ORTHOPEDIC SURGERY | Facility: CLINIC | Age: 29
End: 2024-04-16
Payer: COMMERCIAL

## 2024-04-16 PROCEDURE — 99203 OFFICE O/P NEW LOW 30 MIN: CPT

## 2024-04-16 PROCEDURE — 99213 OFFICE O/P EST LOW 20 MIN: CPT

## 2024-04-17 NOTE — HISTORY OF PRESENT ILLNESS
[de-identified] : pt here for left elbow injury pain with activity and sports more pain to medial elbow  nad left elbow ttp medial jt line no valgus instability from 5/5 strength nvi  mri left elbow: stress rxn coranoid  plan went over findings explained stress rxn will rest from activity if no improvement will get mri arthrogram left elbow

## 2024-05-28 ENCOUNTER — APPOINTMENT (OUTPATIENT)
Dept: ORTHOPEDIC SURGERY | Facility: CLINIC | Age: 29
End: 2024-05-28
Payer: COMMERCIAL

## 2024-05-28 DIAGNOSIS — S59.902A UNSPECIFIED INJURY OF LEFT ELBOW, INITIAL ENCOUNTER: ICD-10-CM

## 2024-05-28 PROCEDURE — 99213 OFFICE O/P EST LOW 20 MIN: CPT

## 2024-06-03 PROBLEM — S59.902A ELBOW INJURY, LEFT, INITIAL ENCOUNTER: Status: ACTIVE | Noted: 2024-04-04

## 2024-06-03 NOTE — HISTORY OF PRESENT ILLNESS
[de-identified] : pt here for left elbow injury pain with activity and sports more pain to medial elbow  still having pain despite cons tx and rest, no improvement  nad left elbow ttp medial jt line no valgus instability from 5/5 strength nvi  mri left elbow: stress rxn coranoid  plan went over findings explained stress rxn cont cons tx and rest will refer to VR for opinion on coranoid stress rxn if no improvement

## 2024-07-15 ENCOUNTER — APPOINTMENT (OUTPATIENT)
Dept: ORTHOPEDIC SURGERY | Facility: CLINIC | Age: 29
End: 2024-07-15
Payer: COMMERCIAL

## 2024-07-15 DIAGNOSIS — G56.32 LESION OF RADIAL NERVE, LEFT UPPER LIMB: ICD-10-CM

## 2024-07-15 DIAGNOSIS — M77.12 LATERAL EPICONDYLITIS, LEFT ELBOW: ICD-10-CM

## 2024-07-15 PROCEDURE — 99212 OFFICE O/P EST SF 10 MIN: CPT

## 2024-07-15 PROCEDURE — 99202 OFFICE O/P NEW SF 15 MIN: CPT

## 2024-09-23 ENCOUNTER — APPOINTMENT (OUTPATIENT)
Dept: ORTHOPEDIC SURGERY | Facility: CLINIC | Age: 29
End: 2024-09-23

## 2025-02-17 NOTE — IMAGING
[de-identified] : Examination of the left elbow patient has mild swelling, some discomfort over the lateral epicondyle, nontender over the medial epicondyle. Patient has some discomfort on palpating on the posterior aspect of the olecranon. Patient has decreased motor strength when stressing the triceps when compared to the right. Patient has pain with full flexion and full extension. Vascular intact.  X-ray of the left elbow, 3 views were taken, negative for any acute fracture or dislocation. 84